# Patient Record
Sex: FEMALE | Race: WHITE | NOT HISPANIC OR LATINO | Employment: FULL TIME | URBAN - METROPOLITAN AREA
[De-identification: names, ages, dates, MRNs, and addresses within clinical notes are randomized per-mention and may not be internally consistent; named-entity substitution may affect disease eponyms.]

---

## 2017-07-26 ENCOUNTER — OFFICE VISIT (OUTPATIENT)
Dept: INTERNAL MEDICINE | Facility: CLINIC | Age: 47
End: 2017-07-26
Payer: COMMERCIAL

## 2017-07-26 VITALS
BODY MASS INDEX: 30.86 KG/M2 | WEIGHT: 192 LBS | HEIGHT: 66 IN | HEART RATE: 72 BPM | OXYGEN SATURATION: 99 % | DIASTOLIC BLOOD PRESSURE: 82 MMHG | TEMPERATURE: 98.4 F | SYSTOLIC BLOOD PRESSURE: 114 MMHG

## 2017-07-26 DIAGNOSIS — M75.51 BURSITIS OF RIGHT SHOULDER: Primary | ICD-10-CM

## 2017-07-26 PROCEDURE — 99203 OFFICE O/P NEW LOW 30 MIN: CPT | Performed by: INTERNAL MEDICINE

## 2017-07-26 RX ORDER — CALCIUM CARBONATE 500(1250)
2 TABLET ORAL 2 TIMES DAILY
COMMUNITY

## 2017-07-26 RX ORDER — IBUPROFEN 200 MG
400 TABLET ORAL
COMMUNITY
Start: 2017-07-26 | End: 2017-08-02

## 2017-07-26 RX ORDER — DIPHENOXYLATE HYDROCHLORIDE AND ATROPINE SULFATE 2.5; .025 MG/1; MG/1
TABLET ORAL
COMMUNITY

## 2017-07-26 NOTE — PROGRESS NOTES
SUBJECTIVE:                                                    Lennie  is a 47 year old female who presents to clinic today for the following health issues:    She states she hurt her shoulder after doing a particular Willy-Chi move in which she extends her arm out against another individual , elbow bent. She is supposed to use her lower body to force the individual down/over but in this case she used too much of her upper extremity and noted afterwards pain in the anterior and lateral part of her shoulder. Now she notes intermittent pain in these areas. No pain at night.  No swelling. No problems with ROM though can feel this with abduction and external rotation.    Joint Pain    Onset: few weeks    Description:   Location: right shoulder  Character: Dull ache    Intensity: mild    Progression of Symptoms: same    Accompanying Signs & Symptoms:  Other symptoms: none    History:   Previous similar pain: no       Precipitating factors:   Trauma or overuse: YES    Alleviating factors:  Improved by: rest/inactivity    Therapies Tried and outcome: rest and massage      No past medical history on file.  No past surgical history on file.  Current Outpatient Prescriptions   Medication Sig Dispense Refill     Multiple Vitamin (MULTI-VITAMINS) TABS        calcium carbonate (OS-ANGEL 500 MG [place]. [place]) 1250 MG tablet Take 2 tablets by mouth 2 times daily       ibuprofen (CVS IBUPROFEN IB) 200 MG tablet Take 2 tablets (400 mg) by mouth 3 times daily (before meals) for 7 days       Allergies as of 07/26/2017     (No Known Allergies)       Social History     Social History     Marital status: Single     Spouse name: N/A     Number of children: N/A     Years of education: N/A     Occupational History     Not on file.     Social History Main Topics     Smoking status: Never Smoker     Smokeless tobacco: Never Used     Alcohol use Yes      Comment: occasional     Drug use: No     Sexual activity: Yes     Other Topics Concern  "    Not on file     Social History Narrative     No narrative on file       No family history on file.    Problem list and histories reviewed & adjusted, as indicated.  Additional history: as documented    Labs reviewed in EPIC    Reviewed and updated as needed this visit by clinical staffTobacco  Allergies  Soc Hx      Reviewed and updated as needed this visit by Provider         ROS:  Constitutional, HEENT, cardiovascular, pulmonary, gi and gu systems are negative, except as otherwise noted.  Ortho- R 4th finger pain.   Eyes- some mild swelling under L eye    OBJECTIVE:                                                    /82  Pulse 72  Temp 98.4  F (36.9  C) (Oral)  Ht 5' 6.25\" (1.683 m)  Wt 192 lb (87.1 kg)  LMP 07/19/2017  SpO2 99%  Breastfeeding? No  BMI 30.76 kg/m2  Body mass index is 30.76 kg/(m^2).  GENERAL APPEARANCE: alert and no distress  HENT: ear canals and TM's normal and nose and mouth without ulcers or lesions  NECK: no adenopathy, no asymmetry, masses, or scars and thyroid normal to palpation  RESP: lungs clear to auscultation - no rales, rhonchi or wheezes  CV: regular rates and rhythm, normal S1 S2, no S3 or S4 and no murmur, click or rub  MS: lateral shoulder tenderness on R. Good ROM- normal and strength. No impingement symptoms     Diagnostic test results:  none        ASSESSMENT/PLAN:                                                    1. Bursitis of right shoulder  See [facility], try nsaids x 7-10 d  If no improvement after 2-3 wks consider sports med  - SPORTS MEDICINE REFERRAL  - [facility] PT, HAND, AND CHIROPRACTIC REFERRAL  - ibuprofen (CVS IBUPROFEN IB) 200 MG tablet; Take 2 tablets (400 mg) by mouth 3 times daily (before meals) for 7 days      Follow up with Provider - asher Mandel MD  [facility]    "

## 2017-07-26 NOTE — MR AVS SNAPSHOT
After Visit Summary   7/26/2017    Lennie More    MRN: [id number]           Patient Information     Date Of Birth          1970        Visit Information        Provider Department      7/26/2017 10:40 AM Austin Mandel MD [facility]        Today's Diagnoses     Bursitis of right shoulder    -  1       Follow-ups after your visit        Additional Services     ENE PT, HAND, AND CHIROPRACTIC REFERRAL       **This order will print in the [facility] Scheduling Office**    Physical Therapy, Hand Therapy and Chiropractic Care are available through:    *[facility]  *[facility]  *[facility]    Call one number to schedule at any of the above locations: [phone].    Your provider has referred you to: Physical Therapy at [facility] or [facility]    Indication/Reason for Referral: Shoulder Pain  Onset of Illness: 2 wks  Therapy Orders: Evaluate and Treat  Special Programs: None  Special Request: None    Dorys Saul      Additional Comments for the Therapist or Chiropractor:     Please be aware that coverage of these services is subject to the terms and limitations of your health insurance plan.  Call member services at your health plan with any benefit or coverage questions.      Please bring the following to your appointment:    *Your personal calendar for scheduling future appointments  *Comfortable clothing            SPORTS MEDICINE REFERRAL       Your provider has referred you to:  [facility]: [facility] - [facility]  [phone]   [email or web address]    Please be aware that coverage of these services is subject to the terms and limitations of your health insurance plan.  Call member services at your health plan with any benefit or coverage questions.      Please bring the following to your appointment:    >>   Any  "x-rays, CTs or MRIs which have been performed.  Contact the facility where they were done to arrange for  prior to your scheduled appointment.    >>   List of current medications   >>   This referral request   >>   Any documents/labs given to you for this referral                  Who to contact     If you have questions or need follow up information about today's clinic visit or your schedule please contact [facility] directly at [phone].  Normal or non-critical lab and imaging results will be communicated to you by Zero2IPOhart, letter or phone within 4 business days after the clinic has received the results. If you do not hear from us within 7 days, please contact the clinic through [employer] or phone. If you have a critical or abnormal lab result, we will notify you by phone as soon as possible.  Submit refill requests through [employer] or call your pharmacy and they will forward the refill request to us. Please allow 3 business days for your refill to be completed.          Additional Information About Your Visit        [employer] Information     [employer] lets you send messages to your doctor, view your test results, renew your prescriptions, schedule appointments and more. To sign up, go to [email or web address] . Click on \"Log in\" on the left side of the screen, which will take you to the Welcome page. Then click on \"Sign up Now\" on the right side of the page.     You will be asked to enter the access code listed below, as well as some personal information. Please follow the directions to create your username and password.     Your access code is: [email or web address]  Expires: 10/24/2017 11:13 AM     Your access code will  in 90 days. If you need help or a new code, please call your [facility] clinic or [phone].        Care EveryWhere ID     This is your Care EveryWhere ID. This could be used by other organizations to access your [facility] medical records  [id number]        Your " "Vitals Were     Pulse Temperature Height Last Period Pulse Oximetry Breastfeeding?    72 98.4  F (36.9  C) (Oral) 5' 6.25\" (1.683 m) 07/19/2017 99% No    BMI (Body Mass Index)                   30.76 kg/m2            Blood Pressure from Last 3 Encounters:   07/26/17 114/82    Weight from Last 3 Encounters:   07/26/17 192 lb (87.1 kg)              We Performed the Following     [facility] PT, HAND, AND CHIROPRACTIC REFERRAL     SPORTS MEDICINE REFERRAL          Today's Medication Changes          These changes are accurate as of: 7/26/17 11:13 AM.  If you have any questions, ask your nurse or doctor.               Start taking these medicines.        Dose/Directions    ibuprofen 200 MG tablet   Commonly known as:  CVS IBUPROFEN IB   Used for:  Bursitis of right shoulder   Started by:  Austin Mandel MD        Dose:  400 mg   Take 2 tablets (400 mg) by mouth 3 times daily (before meals) for 7 days   Refills:  0            Where to get your medicines      Some of these will need a paper prescription and others can be bought over the counter.  Ask your nurse if you have questions.     You don't need a prescription for these medications     ibuprofen 200 MG tablet                Primary Care Provider    None Specified       No primary provider on file.        Equal Access to Services     MOI ROCA AH: Fernando Rey, waayoda suzan, qaybta kaalmada brendan, patricia carmichael. So [facility] [phone].    ATENCIÓN: Si habla español, tiene a vidal disposición servicios gratuitos de asistencia lingüística. Ger al [phone].    We comply with applicable federal civil rights laws and [place] laws. We do not discriminate on the basis of race, color, national origin, age, disability sex, sexual orientation or gender identity.            Thank you!     Thank you for choosing [facility]  for your care. Our goal is always to provide you with excellent care. Hearing " back from our patients is one way we can continue to improve our services. Please take a few minutes to complete the written survey that you may receive in the mail after your visit with us. Thank you!             Your Updated Medication List - Protect others around you: Learn how to safely use, store and throw away your medicines at www.disposemymeds.org.          This list is accurate as of: 7/26/17 11:13 AM.  Always use your most recent med list.                   Brand Name Dispense Instructions for use Diagnosis    calcium carbonate 1250 MG tablet    OS-ANGEL 500 mg [place]. [place]     Take 2 tablets by mouth 2 times daily    Bursitis of right shoulder       ibuprofen 200 MG tablet    CVS IBUPROFEN IB     Take 2 tablets (400 mg) by mouth 3 times daily (before meals) for 7 days    Bursitis of right shoulder       MULTI-VITAMINS Tabs       Bursitis of right shoulder

## 2017-07-26 NOTE — NURSING NOTE
"Chief Complaint   Patient presents with     Shoulder Injury       Initial /82  Pulse 72  Temp 98.4  F (36.9  C) (Oral)  Ht 5' 6.25\" (1.683 m)  Wt 192 lb (87.1 kg)  LMP 07/19/2017  SpO2 99%  Breastfeeding? No  BMI 30.76 kg/m2 Estimated body mass index is 30.76 kg/(m^2) as calculated from the following:    Height as of this encounter: 5' 6.25\" (1.683 m).    Weight as of this encounter: 192 lb (87.1 kg).  Medication Reconciliation: complete    "

## 2017-07-31 ENCOUNTER — THERAPY VISIT (OUTPATIENT)
Dept: PHYSICAL THERAPY | Facility: CLINIC | Age: 47
End: 2017-07-31
Payer: COMMERCIAL

## 2017-07-31 DIAGNOSIS — M25.511 RIGHT SHOULDER PAIN: Primary | ICD-10-CM

## 2017-07-31 PROCEDURE — 97161 PT EVAL LOW COMPLEX 20 MIN: CPT | Mod: GP | Performed by: PHYSICAL THERAPIST

## 2017-07-31 PROCEDURE — 97530 THERAPEUTIC ACTIVITIES: CPT | Mod: GP | Performed by: PHYSICAL THERAPIST

## 2017-07-31 PROCEDURE — 97110 THERAPEUTIC EXERCISES: CPT | Mod: GP | Performed by: PHYSICAL THERAPIST

## 2017-07-31 NOTE — MR AVS SNAPSHOT
"              After Visit Summary   7/31/2017    Lennie More    MRN: [id number]           Patient Information     Date Of Birth          1970        Visit Information        Provider Department      7/31/2017 12:10 PM Aminah Wong PT [facility] Physical Therapy        Today's Diagnoses     Right shoulder pain    -  1       Follow-ups after your visit        Your next 10 appointments already scheduled     Aug 04, 2017 12:50 PM CDT   ENE Extremity with Aminah Wong PT   [facility]  )    [address] [id number] [phone]              Who to contact     If you have questions or need follow up information about today's clinic visit or your schedule please contact [facility] PHYSICAL THERAPY directly at [phone].  Normal or non-critical lab and imaging results will be communicated to you by MyChart, letter or phone within 4 business days after the clinic has received the results. If you do not hear from us within 7 days, please contact the clinic through [employer] or phone. If you have a critical or abnormal lab result, we will notify you by phone as soon as possible.  Submit refill requests through [employer] or call your pharmacy and they will forward the refill request to us. Please allow 3 business days for your refill to be completed.          Additional Information About Your Visit        MyChart Information     [employer] lets you send messages to your doctor, view your test results, renew your prescriptions, schedule appointments and more. To sign up, go to [email or web address] . Click on \"Log in\" on the left side of the screen, which will take you to the Welcome page. Then click on \"Sign up Now\" on the right side of the page.     You will be asked to enter the access code listed below, as well as some personal information. " Please follow the directions to create your username and password.     Your access code is: [email or web address]  Expires: 10/24/2017 11:13 AM     Your access code will  in 90 days. If you need help or a new code, please call your [facility] clinic or [phone].        Care EveryWhere ID     This is your Care EveryWhere ID. This could be used by other organizations to access your [facility] medical records  [id number]        Your Vitals Were     Last Period                   2017            Blood Pressure from Last 3 Encounters:   17 114/82    Weight from Last 3 Encounters:   17 87.1 kg (192 lb)              We Performed the Following     HC PT EVAL, LOW COMPLEXITY     ENE INITIAL EVAL REPORT     THERAPEUTIC ACTIVITIES     THERAPEUTIC EXERCISES        Primary Care Provider    None Specified       No primary provider on file.        Equal Access to Services     [facility]: Hadii nimco natarajan Sobrenna, waaxda luqadaha, qaybta kaalmada brendan, patricia otto . So [facility] [phone].    ATENCIÓN: Si habla español, tiene a vidal disposición servicios gratuitos de asistencia lingüística. Llame al [phone].    We comply with applicable federal civil rights laws and [place] laws. We do not discriminate on the basis of race, color, national origin, age, disability sex, sexual orientation or gender identity.            Thank you!     Thank you for choosing [facility]  for your care. Our goal is always to provide you with excellent care. Hearing back from our patients is one way we can continue to improve our services. Please take a few minutes to complete the written survey that you may receive in the mail after your visit with us. Thank you!             Your Updated Medication List - Protect others around you: Learn how to safely use, store and throw away your medicines at www.disposemymeds.org.          This list is  accurate as of: 7/31/17  3:52 PM.  Always use your most recent med list.                   Brand Name Dispense Instructions for use Diagnosis    calcium carbonate 1250 MG tablet    OS-ANGEL 500 mg [place]. [place]     Take 2 tablets by mouth 2 times daily    Bursitis of right shoulder       ibuprofen 200 MG tablet    CVS IBUPROFEN IB     Take 2 tablets (400 mg) by mouth 3 times daily (before meals) for 7 days    Bursitis of right shoulder       MULTI-VITAMINS Tabs       Bursitis of right shoulder

## 2017-07-31 NOTE — PROGRESS NOTES
"[facility] Initial Evaluation      Subjective:    Patient is a 47 year old female presenting with rehab right shoulder hpi.   Lennie  is a 47 year old female with a right shoulder condition.        Patient injured her right shoulder in a ferny chi class about 3-4 weeks ago.  She was standing, elbow flexed and pushing against another person in front of her - \"felt something in her shoulder\".  Right shoulder pain is superior and lateral upper arm, ranges 0-6/10, describes as \"dull\".  Symptoms increase with reaching anterior/lateral and lateral, slight with reaching behind, slight with lifting groceries.  She is able to lie on the right without discomfort.  Symptoms decrease with stopping offending activity, with acetominophen (has been taking 2 tablets 4-5x/day).  Patient is right-handed.  Patient was doing 12 hours/week ferny chi and martial arts, nothing for the last week as well as cardio at the club (elliptical and treadmill).  .             Pain is worse in the P.M..     Since onset symptoms are unchanged.    Previous treatment: massage - short-term relief.    General health as reported by patient is good.  Pertinent medical history includes:  Overweight.  Medical allergies: no.  Other surgeries include:  Other.  Current medications:  Anti-inflammatory (OTC ibuprofen).  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  Patient is working in normal job without restrictions.  Primary job tasks include:  Prolonged sitting.    Barriers include:  None as reported by the patient.    Red flags:  None as reported by the patient.                        Objective:    Standing Alignment:      Shoulder/UE:  Rounded shoulders                                       Shoulder Evaluation:  ROM:  AROM:    Flexion:  Left:  WNL    Right:  Minimal limitation, pain past 90 degrees   Extension: Left: WNLRight: minimal limitation  Abduction:  Left: WNL   Right:  WNL - pain past 90 " degrees    Internal Rotation:  Left:  WNL    Right:  WNL  External Rotation:  Left:  WNL    Right:  WNL            Extension/Internal Rotation:  Left:  T6    Right:  T8              Special Tests:        Right shoulder negative for the following special tests:Impingement and Bursal  Palpation:  Palpation assessed shoulder: minimal.    Right shoulder tenderness present at: Supraspinatus                                     General     ROS  MMT left shoulder WNL.   MMT right shoulder 5/5 flexion and IR, 4+/5 abduction and ER with slight pain after testing abduction and ER.      Repeated right shoulder extension with wand overpressure in standing:  Abduction AROM increase, decrease pain with abduction past 90 degrees, pain with shoulder flexion minimal at end ROM only     Cervical AROM: WNL all directions, no discomfort     Assessment/Plan:      Patient is a 47 year old female with right shoulder complaints, indicative of shoulder derangement.  Patient will start with repeated extension with overpressure and will reassess next visit.   Patient has the following significant findings with corresponding treatment plan.                Diagnosis 1:  Right shoulder bursitis    Pain -  self management, education, directional preference exercise and home program  Decreased ROM/flexibility - therapeutic exercise and home program  Decreased strength - therapeutic exercise, therapeutic activities and home program  Decreased function - therapeutic activities and home program    Therapy Evaluation Codes:   1) History comprised of:   Personal factors that impact the plan of care:      None.    Comorbidity factors that impact the plan of care are:      None.     Medications impacting care: None.  2) Examination of Body Systems comprised of:   Body structures and functions that impact the plan of care:      Shoulder.   Activity limitations that impact the plan of care are:      Dressing, Lifting and Sports.  3) Clinical presentation  characteristics are:   Stable/Uncomplicated.  4) Decision-Making    Low complexity using standardized patient assessment instrument and/or measureable assessment of functional outcome.  Cumulative Therapy Evaluation is: Low complexity.    Previous and current functional limitations:  (See Goal Flow Sheet for this information)    Short term and Long term goals: (See Goal Flow Sheet for this information)     Communication ability:  Patient appears to be able to clearly communicate and understand verbal and written communication and follow directions correctly.  Treatment Explanation - The following has been discussed with the patient:   RX ordered/plan of care  Anticipated outcomes  Possible risks and side effects  This patient would benefit from PT intervention to resume normal activities.   Rehab potential is excellent.    Frequency:  1 X week, once daily  Duration:  for 3-6 weeks  Discharge Plan:  Achieve all LTG.  Independent in home treatment program.  Reach maximal therapeutic benefit.    Please refer to the daily flowsheet for treatment today, total treatment time and time spent performing 1:1 timed codes.

## 2017-08-04 ENCOUNTER — THERAPY VISIT (OUTPATIENT)
Dept: PHYSICAL THERAPY | Facility: CLINIC | Age: 47
End: 2017-08-04
Payer: COMMERCIAL

## 2017-08-04 DIAGNOSIS — M25.511 RIGHT SHOULDER PAIN: ICD-10-CM

## 2017-08-04 PROCEDURE — 97110 THERAPEUTIC EXERCISES: CPT | Mod: GP | Performed by: PHYSICAL THERAPIST

## 2017-08-04 PROCEDURE — 97112 NEUROMUSCULAR REEDUCATION: CPT | Mod: GP | Performed by: PHYSICAL THERAPIST

## 2017-09-13 ENCOUNTER — THERAPY VISIT (OUTPATIENT)
Dept: PHYSICAL THERAPY | Facility: CLINIC | Age: 47
End: 2017-09-13
Payer: COMMERCIAL

## 2017-09-13 DIAGNOSIS — M25.511 RIGHT SHOULDER PAIN: ICD-10-CM

## 2017-09-13 PROCEDURE — 97112 NEUROMUSCULAR REEDUCATION: CPT | Mod: GP | Performed by: PHYSICAL THERAPIST

## 2017-09-13 PROCEDURE — 97110 THERAPEUTIC EXERCISES: CPT | Mod: GP | Performed by: PHYSICAL THERAPIST

## 2017-09-27 ENCOUNTER — THERAPY VISIT (OUTPATIENT)
Dept: PHYSICAL THERAPY | Facility: CLINIC | Age: 47
End: 2017-09-27
Payer: COMMERCIAL

## 2017-09-27 DIAGNOSIS — M25.511 RIGHT SHOULDER PAIN: ICD-10-CM

## 2017-09-27 PROCEDURE — 97110 THERAPEUTIC EXERCISES: CPT | Mod: GP | Performed by: PHYSICAL THERAPIST

## 2017-09-27 PROCEDURE — 97530 THERAPEUTIC ACTIVITIES: CPT | Mod: GP | Performed by: PHYSICAL THERAPIST

## 2017-09-27 NOTE — MR AVS SNAPSHOT
"              After Visit Summary   2017    Lennie More    MRN: [id number]           Patient Information     Date Of Birth          1970        Visit Information        Provider Department      2017 7:40 AM Aminah Wong PT [facility] Physical Therapy        Today's Diagnoses     Right shoulder pain           Follow-ups after your visit        Who to contact     If you have questions or need follow up information about today's clinic visit or your schedule please contact [facility] PHYSICAL THERAPY directly at [phone].  Normal or non-critical lab and imaging results will be communicated to you by GOintegrohart, letter or phone within 4 business days after the clinic has received the results. If you do not hear from us within 7 days, please contact the clinic through Labtript or phone. If you have a critical or abnormal lab result, we will notify you by phone as soon as possible.  Submit refill requests through The [employer] or call your pharmacy and they will forward the refill request to us. Please allow 3 business days for your refill to be completed.          Additional Information About Your Visit        MyChart Information     The Wireless Registry lets you send messages to your doctor, view your test results, renew your prescriptions, schedule appointments and more. To sign up, go to [email or web address] . Click on \"Log in\" on the left side of the screen, which will take you to the Welcome page. Then click on \"Sign up Now\" on the right side of the page.     You will be asked to enter the access code listed below, as well as some personal information. Please follow the directions to create your username and password.     Your access code is: [email or web address]  Expires: 10/24/2017 11:13 AM     Your access code will  in 90 days. If you need help or a new code, please call your [place] clinic or [phone].        Care EveryWhere ID     " This is your Care EveryWhere ID. This could be used by other organizations to access your [place] medical records  [id number]         Blood Pressure from Last 3 Encounters:   07/26/17 114/82    Weight from Last 3 Encounters:   07/26/17 87.1 kg (192 lb)              We Performed the Following     THERAPEUTIC ACTIVITIES     THERAPEUTIC EXERCISES        Primary Care Provider    None Specified       No primary provider on file.        Equal Access to Services     [facility]: Hadii aad ku hadasho Soomaali, waaxda luqadaha, qaybta kaalmada adeegyada, waxay paulinoin haydandyn adedonita valenciasherrie toto . So [facility] [phone].    ATENCIÓN: Si habla español, tiene a vidal disposición servicios gratuitos de asistencia lingüística. Llame al [phone].    We comply with applicable federal civil rights laws and [place] laws. We do not discriminate on the basis of race, color, national origin, age, disability sex, sexual orientation or gender identity.            Thank you!     Thank you for choosing [place] FOR ATHLETIC MEDICINE [facility] PHYSICAL THERAPY  for your care. Our goal is always to provide you with excellent care. Hearing back from our patients is one way we can continue to improve our services. Please take a few minutes to complete the written survey that you may receive in the mail after your visit with us. Thank you!             Your Updated Medication List - Protect others around you: Learn how to safely use, store and throw away your medicines at www.disposemymeds.org.          This list is accurate as of: 9/27/17  4:54 PM.  Always use your most recent med list.                   Brand Name Dispense Instructions for use Diagnosis    calcium carbonate 1250 MG tablet    OS-ANGEL 500 mg [place]. Ca     Take 2 tablets by mouth 2 times daily    Bursitis of right shoulder       MULTI-VITAMINS Tabs       Bursitis of right shoulder

## 2018-03-26 ENCOUNTER — OFFICE VISIT (OUTPATIENT)
Dept: INTERNAL MEDICINE | Facility: CLINIC | Age: 48
End: 2018-03-26
Payer: COMMERCIAL

## 2018-03-26 VITALS
RESPIRATION RATE: 16 BRPM | HEIGHT: 66 IN | WEIGHT: 193.3 LBS | OXYGEN SATURATION: 97 % | TEMPERATURE: 98.1 F | BODY MASS INDEX: 31.07 KG/M2 | DIASTOLIC BLOOD PRESSURE: 90 MMHG | HEART RATE: 77 BPM | SYSTOLIC BLOOD PRESSURE: 142 MMHG

## 2018-03-26 DIAGNOSIS — J06.9 UPPER RESPIRATORY TRACT INFECTION, UNSPECIFIED TYPE: Primary | ICD-10-CM

## 2018-03-26 PROCEDURE — 99213 OFFICE O/P EST LOW 20 MIN: CPT | Performed by: PHYSICIAN ASSISTANT

## 2018-03-26 ASSESSMENT — PAIN SCALES - GENERAL: PAINLEVEL: NO PAIN (0)

## 2018-03-26 NOTE — MR AVS SNAPSHOT
"              After Visit Summary   3/26/2018    Lennie More    MRN: [id number]           Patient Information     Date Of Birth          1970        Visit Information        Provider Department      3/26/2018 10:00 AM Jillian Eaton PA-C [facility]        Today's Diagnoses     Upper respiratory tract infection, unspecified type    -  1      Care Instructions    Robitussin DM  Delsym as need for coughing.    Saline Rinse to help with congestion.               Follow-ups after your visit        Who to contact     If you have questions or need follow up information about today's clinic visit or your schedule please contact [facility] directly at [phone].  Normal or non-critical lab and imaging results will be communicated to you by Nomacorchart, letter or phone within 4 business days after the clinic has received the results. If you do not hear from us within 7 days, please contact the clinic through Nomacorchart or phone. If you have a critical or abnormal lab result, we will notify you by phone as soon as possible.  Submit refill requests through Genecure or call your pharmacy and they will forward the refill request to us. Please allow 3 business days for your refill to be completed.          Additional Information About Your Visit        MyChart Information     Genecure lets you send messages to your doctor, view your test results, renew your prescriptions, schedule appointments and more. To sign up, go to [email or web address] . Click on \"Log in\" on the left side of the screen, which will take you to the Welcome page. Then click on \"Sign up Now\" on the right side of the page.     You will be asked to enter the access code listed below, as well as some personal information. Please follow the directions to create your username and password.     Your access code is: [email or web address]  Expires: 2018 10:25 AM     Your access code will  in 90 days. If " "you need help or a new code, please call your [place] clinic or [phone].        Care EveryWhere ID     This is your Care EveryWhere ID. This could be used by other organizations to access your [place] medical records  [id number]        Your Vitals Were     Pulse Temperature Respirations Height Pulse Oximetry BMI (Body Mass Index)    77 98.1  F (36.7  C) (Oral) 16 5' 6\" (1.676 m) 97% 31.2 kg/m2       Blood Pressure from Last 3 Encounters:   03/26/18 142/90   07/26/17 114/82    Weight from Last 3 Encounters:   03/26/18 193 lb 4.8 oz (87.7 kg)   07/26/17 192 lb (87.1 kg)              Today, you had the following     No orders found for display       Primary Care Provider    None Specified       No primary provider on file.        Equal Access to Services     [facility]DA [facility] : Hadii nimco Rey, wanancy mares, ruba kaalismael cardona, patricia otto . So [facility] [phone].    ATENCIÓN: Si habla español, tiene a vidal disposición servicios gratuitos de asistencia lingüística. Ger al [phone].    We comply with applicable federal civil rights laws and [place] laws. We do not discriminate on the basis of race, color, national origin, age, disability, sex, sexual orientation, or gender identity.            Thank you!     Thank you for choosing [facility]  for your care. Our goal is always to provide you with excellent care. Hearing back from our patients is one way we can continue to improve our services. Please take a few minutes to complete the written survey that you may receive in the mail after your visit with us. Thank you!             Your Updated Medication List - Protect others around you: Learn how to safely use, store and throw away your medicines at www.disposemymeds.org.          This list is accurate as of 3/26/18 10:25 AM.  Always use your most recent med list.                   Brand Name Dispense Instructions for use Diagnosis    " calcium carbonate 1250 MG tablet    OS-ANGEL 500 mg [place]. Ca     Take 2 tablets by mouth 2 times daily    Bursitis of right shoulder       MULTI-VITAMINS Tabs       Bursitis of right shoulder

## 2018-03-26 NOTE — PROGRESS NOTES
"  SUBJECTIVE:   Lennie  is a 47 year old female who presents to clinic today for the following health issues:      Acute Illness   Acute illness concerns: Cough/Nasal Congestion   Onset: x1 wk     Fever: no     Felt warm     Chills/Sweats: no     Headache (location?): YES    Sinus Pressure:YES    Conjunctivitis:  no    Ear Pain: no    Rhinorrhea: no     Congestion: YES    Sore Throat: YES- slightly -    Slight post nasal      Cough: YES- at time productive     Wheeze: no     Decreased Appetite: YES- slightly     Nausea: no     Vomiting: no     Diarrhea:  no     Dysuria/Freq.: no     Fatigue/Achiness: YES- fatigue     Sick/Strep Exposure: no      Therapies Tried and outcome: nyquil-with some relief of sleep and tylenol-helps with sore throat           -------------------------------------    Problem list and histories reviewed & adjusted, as indicated.  Additional history: as documented    Labs reviewed in EPIC    Reviewed and updated as needed this visit by clinical staff  Tobacco  Allergies  Meds       Reviewed and updated as needed this visit by Provider  Allergies  Meds         ROS:  Constitutional, HEENT, cardiovascular, pulmonary, gi and gu systems are negative, except as otherwise noted.    OBJECTIVE:     /90 (BP Location: Left arm, Patient Position: Chair, Cuff Size: Adult Regular)  Pulse 77  Temp 98.1  F (36.7  C) (Oral)  Resp 16  Ht 5' 6\" (1.676 m)  Wt 193 lb 4.8 oz (87.7 kg)  SpO2 97%  BMI 31.2 kg/m2  Body mass index is 31.2 kg/(m^2).  GENERAL: healthy, alert and no distress  HENT: normal cephalic/atraumatic, ear canals and TM's normal, nose and mouth without ulcers or lesions, nasal mucosa edematous , rhinorrhea clear, oropharynx clear and oral mucous membranes moist  NECK: no adenopathy, no asymmetry, masses, or scars and thyroid normal to palpation  RESP: lungs clear to auscultation - no rales, rhonchi or wheezes  CV: regular rate and rhythm, normal S1 S2, no S3 or S4, no " murmur, click or rub, no peripheral edema and peripheral pulses strong  MS: no gross musculoskeletal defects noted, no edema  SKIN: no suspicious lesions or rashes    Diagnostic Test Results:  none     ASSESSMENT/PLAN:             1. Upper respiratory tract infection, unspecified type        Patient Instructions   Robitussin DM  Delsym as need for coughing.    Saline Rinse to help with congestion.         Fluids and monitor  If worsening cough, fever then recheck in clinic     Jillian Eaton PA-C  [facility]

## 2018-05-11 ENCOUNTER — OFFICE VISIT (OUTPATIENT)
Dept: INTERNAL MEDICINE | Facility: CLINIC | Age: 48
End: 2018-05-11
Payer: COMMERCIAL

## 2018-05-11 VITALS
HEART RATE: 81 BPM | WEIGHT: 189.8 LBS | DIASTOLIC BLOOD PRESSURE: 80 MMHG | TEMPERATURE: 98.7 F | RESPIRATION RATE: 8 BRPM | SYSTOLIC BLOOD PRESSURE: 142 MMHG | OXYGEN SATURATION: 99 % | BODY MASS INDEX: 30.63 KG/M2

## 2018-05-11 DIAGNOSIS — N89.8 VAGINAL IRRITATION: Primary | ICD-10-CM

## 2018-05-11 LAB
SPECIMEN SOURCE: NORMAL
WET PREP SPEC: NORMAL

## 2018-05-11 PROCEDURE — 87210 SMEAR WET MOUNT SALINE/INK: CPT | Performed by: PHYSICIAN ASSISTANT

## 2018-05-11 PROCEDURE — 99213 OFFICE O/P EST LOW 20 MIN: CPT | Performed by: PHYSICIAN ASSISTANT

## 2018-05-11 RX ORDER — CLOTRIMAZOLE 1 %
1 CREAM WITH APPLICATOR VAGINAL DAILY
Status: CANCELLED | OUTPATIENT
Start: 2018-05-11

## 2018-05-11 ASSESSMENT — PAIN SCALES - GENERAL: PAINLEVEL: NO PAIN (1)

## 2018-05-11 NOTE — PROGRESS NOTES
SUBJECTIVE:   Lennie  is a 48 year old female who presents to clinic today for the following health issues:    Chief Complaint   Patient presents with     Vaginal Problem     Pt states that she feels as is she has scratched her labia- and then she feels as if she has a twing that goes up into the abdominal area. there is a discomfort.   x 3 days.      Vaginal Symptoms  Onset: 3 days     Description:  Vaginal Discharge: none   Itching (Pruritis): no   Burning sensation:  no not really - comes and goes   Odor: no     Accompanying Signs & Symptoms:  Pain with Urination: no   Abdominal Pain: YES- LLQ comes and goes as well   Fever: no     History:   Sexually active: no   New Partner: no   Possibility of Pregnancy:  No    Precipitating factors:   Recent Antibiotic Use: no     Alleviating factors:  None     LMP: 14 days ago, colette-menopausal       Problem list and histories reviewed & adjusted, as indicated.  Additional history: as documented      Reviewed and updated as needed this visit by clinical staff  Allergies  Meds  Problems       Reviewed and updated as needed this visit by Provider  Meds  Problems           OBJECTIVE:     /80  Pulse 81  Temp 98.7  F (37.1  C) (Oral)  Resp 8  Wt 189 lb 12.8 oz (86.1 kg)  SpO2 99%  Breastfeeding? No  BMI 30.63 kg/m2  Body mass index is 30.63 kg/(m^2).  GENERAL: healthy, alert and no distress  CV: regular rates and rhythm, normal S1 S2, no S3 or S4 and no murmur, click or rub  ABDOMEN: soft, nontender, no hepatosplenomegaly, no masses and bowel sounds normal   (female): normal female external genitalia- skin breakdown on external labia with mild erythema, normal urethral meatus  and vaginal mucosa pink, moist, well rugated, and no DC or odor noted     Diagnostic Test Results:  Results for orders placed or performed in visit on 05/11/18   Wet prep   Result Value Ref Range    Specimen Description Vagina     Wet Prep No Trichomonas seen     Wet Prep No  clue cells seen     Wet Prep No yeast seen        ASSESSMENT/PLAN:       1. Vaginal irritation  - Wet prep  - based on skin breakdown appearance candidal infection suspected  - pt to try topical monistat bid x 7-14 days   - follow up if no improvement or worsening symptoms   - pt agreed to above plan and all questions are answered     Mariama Royal PA-C  [facility]

## 2018-05-11 NOTE — MR AVS SNAPSHOT
"              After Visit Summary   2018    Lennie More    MRN: [id number]           Patient Information     Date Of Birth          1970        Visit Information        Provider Department      2018 1:00 PM Mariama Royal PA-C [facility] [place]        Today's Diagnoses     Vaginal irritation    -  1      Care Instructions    Try topical monistat twice a day for 7-14 days     - if no relief, contact mw           Follow-ups after your visit        Who to contact     If you have questions or need follow up information about today's clinic visit or your schedule please contact [facility] directly at [phone].  Normal or non-critical lab and imaging results will be communicated to you by MyChart, letter or phone within 4 business days after the clinic has received the results. If you do not hear from us within 7 days, please contact the clinic through MyChart or phone. If you have a critical or abnormal lab result, we will notify you by phone as soon as possible.  Submit refill requests through [employer] or call your pharmacy and they will forward the refill request to us. Please allow 3 business days for your refill to be completed.          Additional Information About Your Visit        MyChart Information     Keen Home lets you send messages to your doctor, view your test results, renew your prescriptions, schedule appointments and more. To sign up, go to [email or web address] . Click on \"Log in\" on the left side of the screen, which will take you to the Welcome page. Then click on \"Sign up Now\" on the right side of the page.     You will be asked to enter the access code listed below, as well as some personal information. Please follow the directions to create your username and password.     Your access code is: [email or web address]  Expires: 2018 10:25 AM     Your access code will  in 90 days. If you need help or a new code, please call your " [facility] or [phone].        Care EveryWhere ID     This is your Care EveryWhere ID. This could be used by other organizations to access your [place] medical records  [id number]        Your Vitals Were     Pulse Temperature Respirations Pulse Oximetry Breastfeeding? BMI (Body Mass Index)    81 98.7  F (37.1  C) (Oral) 8 99% No 30.63 kg/m2       Blood Pressure from Last 3 Encounters:   05/11/18 142/80   03/26/18 142/90   07/26/17 114/82    Weight from Last 3 Encounters:   05/11/18 189 lb 12.8 oz (86.1 kg)   03/26/18 193 lb 4.8 oz (87.7 kg)   07/26/17 192 lb (87.1 kg)              We Performed the Following     Wet prep        Primary Care Provider Fax #    Physician No Ref-Primary [phone]       No address on file        Equal Access to Services     [facility] : Hadii nimco Rey, waaxda luqadaha, qaybta kaalmada brendan, patricia otto . So [facility] [phone].    ATENCIÓN: Si habla español, tiene a vidal disposición servicios gratuitos de asistencia lingüística. Llame al [phone].    We comply with applicable federal civil rights laws and [place] laws. We do not discriminate on the basis of race, color, national origin, age, disability, sex, sexual orientation, or gender identity.            Thank you!     Thank you for choosing [facility]  for your care. Our goal is always to provide you with excellent care. Hearing back from our patients is one way we can continue to improve our services. Please take a few minutes to complete the written survey that you may receive in the mail after your visit with us. Thank you!             Your Updated Medication List - Protect others around you: Learn how to safely use, store and throw away your medicines at www.disposemymeds.org.          This list is accurate as of 5/11/18  2:41 PM.  Always use your most recent med list.                   Brand Name Dispense Instructions for use Diagnosis     calcium carbonate 500 MG tablet    OS-ANGEL 500 mg [place]. Ca     Take 2 tablets by mouth 2 times daily    Bursitis of right shoulder       MULTI-VITAMINS Tabs       Bursitis of right shoulder

## 2018-11-20 PROBLEM — M25.511 RIGHT SHOULDER PAIN: Status: RESOLVED | Noted: 2017-07-31 | Resolved: 2018-11-20

## 2018-11-20 NOTE — PROGRESS NOTES
"Subjective:  HPI                    Objective:  System    Physical Exam    General     ROS    Assessment/Plan:    DISCHARGE REPORT    Progress reporting period is from 7-31-17 to 9-27-17, 4 visits.       SUBJECTIVE  Patient initially had c/o pain right superior shoulder and lateral upper arm, ranged 0-6/10, described as \"dull\".  Symptoms increased with reaching anterior/lateral and lateral, slight with reaching behind, slight with lifting groceries.  She was able to lie on the right without discomfort. She also had secondary c/o bilateral lower arm and hand tingling.  At last visit she reported shoulder continued to improve slowly.  Symptoms were ranging 0-3/10, present primarily with reaching behind, reaching quickly across her body and at the end of her ferny chi workout with her sword.  Had been consistent with shoulder strength program every other day for her shoulder, had started light weight (<1#) for scaption and ER, has not yet added weight to extension.  Had also been doing cervical retraction/extension as prescribed, > every 2 hours, with short-term relief of bilateral hand (digits 4 and 5) and lower arm tingling bilaterally.  She was able to get temporary relief of symptoms, but would return as soon as 15' later.    Patient did not return for further visits so discharge status unknown.  Pain level at last visit: 0/10.     Initial Pain level:  (0-6/10).   Changes in function:  Yes (See Goal flowsheet attached for changes in current functional level)  Adverse reaction to treatment or activity: None    OBJECTIVE  At last visit cervical AROM rotation WNL bilaterally, extension 90%.    Advanced shoulder strength program, modified cervical program.      ASSESSMENT/PLAN  Updated problem list and treatment plan: Diagnosis 1:  Right shoulder bursitis   Assessment of Progress: The patient has not returned to therapy. Current status is unknown.  Self Management Plans:  Patient has been instructed in a home treatment " program.      Recommendations:  Discharge PT chart as did not return for further visits.    Please refer to the daily flowsheet for treatment today, total treatment time and time spent performing 1:1 timed codes.

## 2020-01-29 ENCOUNTER — OFFICE VISIT (OUTPATIENT)
Dept: INTERNAL MEDICINE | Facility: CLINIC | Age: 50
End: 2020-01-29
Payer: COMMERCIAL

## 2020-01-29 VITALS
RESPIRATION RATE: 16 BRPM | OXYGEN SATURATION: 98 % | HEART RATE: 73 BPM | HEIGHT: 66 IN | SYSTOLIC BLOOD PRESSURE: 126 MMHG | WEIGHT: 201.5 LBS | BODY MASS INDEX: 32.38 KG/M2 | DIASTOLIC BLOOD PRESSURE: 76 MMHG | TEMPERATURE: 98.7 F

## 2020-01-29 DIAGNOSIS — Z23 NEED FOR VACCINATION: ICD-10-CM

## 2020-01-29 DIAGNOSIS — Z11.4 ENCOUNTER FOR SCREENING FOR HIV: ICD-10-CM

## 2020-01-29 DIAGNOSIS — Z12.31 ENCOUNTER FOR SCREENING MAMMOGRAM FOR BREAST CANCER: ICD-10-CM

## 2020-01-29 DIAGNOSIS — B00.1 COLD SORE: ICD-10-CM

## 2020-01-29 DIAGNOSIS — Z00.00 ROUTINE HISTORY AND PHYSICAL EXAMINATION OF ADULT: Primary | ICD-10-CM

## 2020-01-29 PROCEDURE — 90714 TD VACC NO PRESV 7 YRS+ IM: CPT | Performed by: PHYSICIAN ASSISTANT

## 2020-01-29 PROCEDURE — 90471 IMMUNIZATION ADMIN: CPT | Performed by: PHYSICIAN ASSISTANT

## 2020-01-29 PROCEDURE — 99396 PREV VISIT EST AGE 40-64: CPT | Mod: 25 | Performed by: PHYSICIAN ASSISTANT

## 2020-01-29 RX ORDER — VALACYCLOVIR HYDROCHLORIDE 1 G/1
2000 TABLET, FILM COATED ORAL 2 TIMES DAILY
Qty: 4 TABLET | Refills: 5 | Status: SHIPPED | OUTPATIENT
Start: 2020-01-29 | End: 2023-11-02

## 2020-01-29 RX ORDER — INFLUENZA A VIRUS A/NEBRASKA/14/2019 (H1N1) ANTIGEN (MDCK CELL DERIVED, PROPIOLACTONE INACTIVATED), INFLUENZA A VIRUS A/DELAWARE/39/2019 (H3N2) ANTIGEN (MDCK CELL DERIVED, PROPIOLACTONE INACTIVATED), INFLUENZA B VIRUS B/SINGAPORE/INFTT-16-0610/2016 ANTIGEN (MDCK CELL DERIVED, PROPIOLACTONE INACTIVATED), INFLUENZA B VIRUS B/DARWIN/7/2019 ANTIGEN (MDCK CELL DERIVED, PROPIOLACTONE INACTIVATED) 15; 15; 15; 15 UG/.5ML; UG/.5ML; UG/.5ML; UG/.5ML
INJECTION, SUSPENSION INTRAMUSCULAR
COMMUNITY
Start: 2019-11-09 | End: 2020-01-29

## 2020-01-29 SDOH — HEALTH STABILITY: MENTAL HEALTH: HOW OFTEN DO YOU HAVE 6 OR MORE DRINKS ON ONE OCCASION?: NEVER

## 2020-01-29 SDOH — HEALTH STABILITY: MENTAL HEALTH: HOW OFTEN DO YOU HAVE A DRINK CONTAINING ALCOHOL?: 4 OR MORE TIMES A WEEK

## 2020-01-29 SDOH — HEALTH STABILITY: MENTAL HEALTH: HOW MANY STANDARD DRINKS CONTAINING ALCOHOL DO YOU HAVE ON A TYPICAL DAY?: 1 OR 2

## 2020-01-29 ASSESSMENT — MIFFLIN-ST. JEOR: SCORE: 1550.75

## 2020-01-29 NOTE — PROGRESS NOTES
Prior to immunization administration, verified patients identity using patient s name and date of birth. Please see Immunization Activity for additional information.     Screening Questionnaire for Adult Immunization    Are you sick today?   No   Do you have allergies to medications, food, a vaccine component or latex?   No   Have you ever had a serious reaction after receiving a vaccination?   No   Do you have a long-term health problem with heart, lung, kidney, or metabolic disease (e.g., diabetes), asthma, a blood disorder, no spleen, complement component deficiency, a cochlear implant, or a spinal fluid leak?  Are you on long-term aspirin therapy?   No   Do you have cancer, leukemia, HIV/AIDS, or any other immune system problem?   No   Do you have a parent, brother, or sister with an immune system problem?   No   In the past 3 months, have you taken medications that affect  your immune system, such as prednisone, other steroids, or anticancer drugs; drugs for the treatment of rheumatoid arthritis, Crohn s disease, or psoriasis; or have you had radiation treatments?   No   Have you had a seizure, or a brain or other nervous system problem?   No   During the past year, have you received a transfusion of blood or blood    products, or been given immune (gamma) globulin or antiviral drug?   No   For women: Are you pregnant or is there a chance you could become       pregnant during the next month?   No   Have you received any vaccinations in the past 4 weeks?   No     Immunization questionnaire answers were all negative.        Per orders of Mariama Adams PA-C, injection of Td given by Brittany Sanches LPN. Patient instructed to remain in clinic for 15 minutes afterwards, and to report any adverse reaction to me immediately.       Screening performed by Brittany Sanches LPN on 1/29/2020 at 2:41 PM.

## 2020-01-29 NOTE — PROGRESS NOTES
SUBJECTIVE:   CC: Lennie  is an 49 year old woman who presents for preventive health visit.     Healthy Habits:     Getting at least 3 servings of Calcium per day:  Yes    Bi-annual eye exam:  Yes    Dental care twice a year:  Yes    Sleep apnea or symptoms of sleep apnea:  None    Diet:  Regular (no restrictions)    Frequency of exercise:  6-7 days/week    Duration of exercise:  30-45 minutes    Taking medications regularly:  Yes    Medication side effects:  Not applicable    PHQ-2 Total Score: 0    Additional concerns today:  Yes    Pt notes history of heat blisters that she is needing to use abrevia for. She is wondering if something else can be given      Today's PHQ-2 Score:   PHQ-2 ( 1999 Pfizer) 1/29/2020   Q1: Little interest or pleasure in doing things 0   Q2: Feeling down, depressed or hopeless 0   PHQ-2 Score 0   Q1: Little interest or pleasure in doing things Not at all   Q2: Feeling down, depressed or hopeless Not at all   PHQ-2 Score 0       Abuse: Current or Past(Physical, Sexual or Emotional)- No  Do you feel safe in your environment? Yes      Social History     Tobacco Use     Smoking status: Never Smoker     Smokeless tobacco: Never Used   Substance Use Topics     Alcohol use: Yes     Alcohol/week: 1.0 standard drinks     Types: 1 Cans of beer per week     Frequency: 4 or more times a week     Drinks per session: 1 or 2     Binge frequency: Never     Comment: one a week      If you drink alcohol do you typically have >3 drinks per day or >7 drinks per week? No    Alcohol Use 1/29/2020   Prescreen: >3 drinks/day or >7 drinks/week? No   Prescreen: >3 drinks/day or >7 drinks/week? -       Reviewed orders with patient.  Reviewed health maintenance and updated orders accordingly - Yes    Mammogram Screening: Patient over age 50, mutual decision to screen reflected in health maintenance.    Pertinent mammograms are reviewed under the imaging tab.  History of abnormal Pap smear: NO - age 30-65  "PAP every 5 years with negative HPV co-testing recommended     Reviewed and updated as needed this visit by clinical staff  Tobacco  Allergies  Meds  Problems  Fam Hx  Soc Hx        Reviewed and updated as needed this visit by Provider  Tobacco  Meds  Problems  Fam Hx  Soc Hx           Review of Systems  CONSTITUTIONAL: NEGATIVE for fever, chills, change in weight  INTEGUMENTARU/SKIN: NEGATIVE for worrisome rashes, moles or lesions  EYES: NEGATIVE for vision changes or irritation  ENT: NEGATIVE for ear, mouth and throat problems  RESP: NEGATIVE for significant cough or SOB  BREAST: NEGATIVE for masses, tenderness or discharge  CV: NEGATIVE for chest pain, palpitations or peripheral edema  GI: NEGATIVE for nausea, abdominal pain, heartburn, or change in bowel habits  : NEGATIVE for unusual urinary or vaginal symptoms. Periods are irregular and getting some hot flashes.   MUSCULOSKELETAL: NEGATIVE for significant arthralgias or myalgia  NEURO: NEGATIVE for weakness, dizziness or paresthesias  PSYCHIATRIC: NEGATIVE for changes in mood or affect     OBJECTIVE:   /76   Pulse 73   Temp 98.7  F (37.1  C) (Oral)   Resp 16   Ht 1.676 m (5' 6\")   Wt 91.4 kg (201 lb 8 oz)   LMP 01/24/2020 (Exact Date)   SpO2 98%   Breastfeeding No   BMI 32.52 kg/m    Physical Exam  GENERAL: healthy, alert and no distress  EYES: Eyes grossly normal to inspection, PERRL and conjunctivae and sclerae normal  HENT: ear canals and TM's normal, nose and mouth without ulcers or lesions  NECK: no adenopathy, no asymmetry, masses, or scars and thyroid normal to palpation  RESP: lungs clear to auscultation - no rales, rhonchi or wheezes  CV: regular rate and rhythm, normal S1 S2, no S3 or S4, no murmur, click or rub, no peripheral edema and peripheral pulses strong  ABDOMEN: soft, nontender, no hepatosplenomegaly, no masses and bowel sounds normal  MS: no gross musculoskeletal defects noted, no edema  SKIN: no suspicious lesions " or rashes  NEURO: Normal strength and tone, mentation intact and speech normal  PSYCH: mentation appears normal, affect normal/bright    Diagnostic Test Results:  Labs reviewed in Epic    ASSESSMENT/PLAN:   1. Routine history and physical examination of adult  - reviewed PMH and health maintenance   - Lipid panel reflex to direct LDL Fasting; Future  - **Glucose FUTURE anytime; Future  - declined pap smear today, will follow up for this     2. Need for vaccination  - TD PRSERV FREE >=7 YRS ADS IM [17794]  - 1st  Administration  [41205]    3. Encounter for screening mammogram for breast cancer  - *MA Screening Digital Bilateral; Future    4. Encounter for screening for HIV  - **HIV Antigen Antibody Combo FUTURE anytime; Future    5. Cold sore  - not present today, trial as below if no improvement, pt to notify me and we can consider dermatology referral   - valACYclovir (VALTREX) 1000 mg tablet; Take 2 tablets (2,000 mg) by mouth 2 times daily for 1 day  Dispense: 4 tablet; Refill: 5      Mariama M. Lele Adams PA-C  [facility]

## 2021-04-06 ENCOUNTER — TELEPHONE (OUTPATIENT)
Dept: FAMILY MEDICINE | Facility: OTHER | Age: 51
End: 2021-04-06

## 2021-04-06 NOTE — TELEPHONE ENCOUNTER
Anahi was seen in [place] ER for finger fracture and leg injury (XR normal).  Do you want to see her or have her follow-up with ortho?   Left a message to confirm appt was made in [place] and to verify registration info

## 2021-04-07 ENCOUNTER — IMMUNIZATION (OUTPATIENT)
Dept: FAMILY MEDICINE | Facility: OTHER | Age: 51
End: 2021-04-07
Attending: FAMILY MEDICINE
Payer: COMMERCIAL

## 2021-04-07 PROCEDURE — 91300 PR COVID VAC PFIZER DIL RECON 30 MCG/0.3 ML IM: CPT

## 2021-04-07 PROCEDURE — 0001A PR COVID VAC PFIZER DIL RECON 30 MCG/0.3 ML IM: CPT

## 2021-04-24 ENCOUNTER — HEALTH MAINTENANCE LETTER (OUTPATIENT)
Age: 51
End: 2021-04-24

## 2021-04-26 ENCOUNTER — IMMUNIZATION (OUTPATIENT)
Dept: FAMILY MEDICINE | Facility: OTHER | Age: 51
End: 2021-04-26
Attending: FAMILY MEDICINE
Payer: COMMERCIAL

## 2021-04-26 PROCEDURE — 91300 PR COVID VAC PFIZER DIL RECON 30 MCG/0.3 ML IM: CPT

## 2021-04-26 PROCEDURE — 0002A PR COVID VAC PFIZER DIL RECON 30 MCG/0.3 ML IM: CPT

## 2021-10-09 ENCOUNTER — HEALTH MAINTENANCE LETTER (OUTPATIENT)
Age: 51
End: 2021-10-09

## 2022-05-21 ENCOUNTER — HEALTH MAINTENANCE LETTER (OUTPATIENT)
Age: 52
End: 2022-05-21

## 2022-08-30 ENCOUNTER — VIRTUAL VISIT (OUTPATIENT)
Dept: FAMILY MEDICINE | Facility: CLINIC | Age: 52
End: 2022-08-30
Payer: COMMERCIAL

## 2022-08-30 DIAGNOSIS — N95.1 MENOPAUSAL SYNDROME (HOT FLASHES): Primary | ICD-10-CM

## 2022-08-30 PROCEDURE — 99213 OFFICE O/P EST LOW 20 MIN: CPT | Mod: 95 | Performed by: PHYSICIAN ASSISTANT

## 2022-08-30 RX ORDER — GABAPENTIN 300 MG/1
CAPSULE ORAL
Qty: 60 CAPSULE | Refills: 0 | Status: SHIPPED | OUTPATIENT
Start: 2022-08-30 | End: 2022-09-27

## 2022-08-30 NOTE — PROGRESS NOTES
Patient is being evaluated via a billable video visit.      How would you like to obtain your AVS? Qumas       Video Start Time: 3:04 PM    Assessment & Plan  appears well, discussed risk /benefits HRT and nonHRT, she chose as below  Problem List Items Addressed This Visit    None     Visit Diagnoses     Menopausal syndrome (hot flashes)    -  Primary    Relevant Medications    gabapentin (NEURONTIN) 300 MG capsule            24 minutes spent on the date of the encounter doing chart review, history and exam, documentation and further activities as noted above          Return in about 4 weeks (around 9/27/2022) for PCP Follow-up, Preventative Care Appt.    KIMBERLY Gamino  [facility]    Subjective     Presents presents to clinic today for the following health issues     HPI   Reason for visit:  Requesting HRT to kill the hot flashes, which are destroying my sleep.  Symptom onset:  Several years ago  Symptoms include:  Whole body is suddenly hot and sweaty. Wakes me up several times per night.  Symptom intensity:  Severe  Symptom progression:  Worsening  Had these symptoms before:  Yes  Has tried/received treatment for these symptoms:  Yes  Previous treatment was successful:  No  What makes it worse:  Trying to relax or sleep  What makes it better:  No  Denies hx smoking, CAD, VTE, breast cancer.    LMP 10/21  Does have uterus.  Tried black cohosh and acupuntucre which both stopped working.          Review of Systems   GYN- hot flashes as above      Objective           Vitals:  No vitals were obtained today due to virtual visit.    Physical Exam   GENERAL: Healthy, alert and no distress  EYES: Eyes grossly normal to inspection.  No discharge or erythema, or obvious scleral/conjunctival abnormalities.  RESP: No audible wheeze, cough, or visible cyanosis.  No visible retractions or increased work of breathing.    SKIN: Visible skin clear. No significant rash, abnormal  pigmentation or lesions.  NEURO: Cranial nerves grossly intact.  Mentation and speech appropriate for age.  PSYCH: Mentation appears normal, affect normal/bright, judgement and insight intact, normal speech and appearance well-groomed.              Video-Visit Details    Type of service:  Video Visit    Video End Time:3:20 PM    Originating Location (pt. Location): Home    Distant Location (provider location):  [facility]     Platform used for Video Visit: Q.ME

## 2022-08-30 NOTE — PATIENT INSTRUCTIONS
At [facility], we strive to deliver an exceptional experience to you, every time we see you. If you receive a survey, please complete it as we do value your feedback.  If you have MyChart, you can expect to receive results automatically within 24 hours of their completion.  Your provider will send a note interpreting your results as well.   If you do not have MyChart, you should receive your results in about a week by mail.    Your care team:                            Family Medicine Internal Medicine   MD Gentry Wolfe MD Shantel Branch-Fleming, MD Srinivasa Vaka, MD Katya Belousova, JOHN Chu CNP, MD ([facility]   Kendall Alvarado, MD Mariama Hung MD Amelia Massimini APRN CNP Kim Thein, APRN CNP Bethany Templen, MD             Clinic hours: Monday - Thursday 7 am-6 pm; Fridays 7 am-5 pm.   Urgent care: Monday - Friday 10 am- 8 pm; Saturday and Sunday 9 am-5 pm.    Clinic: [phone]       [facility]: Monday - Thursday 8 am - 7 pm; Friday 8 am - 6 pm  [facility]: [phone]

## 2022-09-11 ENCOUNTER — HEALTH MAINTENANCE LETTER (OUTPATIENT)
Age: 52
End: 2022-09-11

## 2022-09-27 ENCOUNTER — MYC REFILL (OUTPATIENT)
Dept: FAMILY MEDICINE | Facility: CLINIC | Age: 52
End: 2022-09-27

## 2022-09-27 DIAGNOSIS — N95.1 MENOPAUSAL SYNDROME (HOT FLASHES): ICD-10-CM

## 2022-09-28 RX ORDER — GABAPENTIN 300 MG/1
300 CAPSULE ORAL 2 TIMES DAILY
Qty: 180 CAPSULE | Refills: 0 | Status: SHIPPED | OUTPATIENT
Start: 2022-09-28 | End: 2022-11-30

## 2022-11-30 ENCOUNTER — MYC REFILL (OUTPATIENT)
Dept: FAMILY MEDICINE | Facility: CLINIC | Age: 52
End: 2022-11-30

## 2022-11-30 DIAGNOSIS — N95.1 MENOPAUSAL SYNDROME (HOT FLASHES): ICD-10-CM

## 2022-11-30 RX ORDER — GABAPENTIN 300 MG/1
300 CAPSULE ORAL 3 TIMES DAILY
Qty: 270 CAPSULE | Refills: 0 | Status: SHIPPED | OUTPATIENT
Start: 2022-11-30 | End: 2023-11-02

## 2022-12-21 ENCOUNTER — TELEPHONE (OUTPATIENT)
Dept: FAMILY MEDICINE | Facility: CLINIC | Age: 52
End: 2022-12-21

## 2022-12-21 NOTE — TELEPHONE ENCOUNTER
Patient Quality Outreach    Patient is due for the following:   Breast Cancer Screening - Mammogram    Next Steps:   Schedule a office visit for mammogram    Type of outreach:    Phone, left message for patient/parent to call back.    Next Steps:  Reach out within 90 days via [employer] (Inspect Manager).    Max number of attempts reached: Yes. Will try again in 90 days if patient still on fail list.    Questions for provider review:    None     Bob Ledezma

## 2023-02-02 ENCOUNTER — E-VISIT (OUTPATIENT)
Dept: FAMILY MEDICINE | Facility: CLINIC | Age: 53
End: 2023-02-02
Payer: COMMERCIAL

## 2023-02-02 DIAGNOSIS — N95.1 MENOPAUSAL SYNDROME (HOT FLASHES): Primary | ICD-10-CM

## 2023-02-02 PROCEDURE — 99421 OL DIG E/M SVC 5-10 MIN: CPT | Performed by: PHYSICIAN ASSISTANT

## 2023-02-03 NOTE — TELEPHONE ENCOUNTER
Provider E-Visit time total (minutes):8      17-beta estradiol (either transdermal or oral, depending upon underlying comorbidities and patient preference) and cyclic administration of oral micronized progesterone (200 mg/day for the first 12 days of each calendar month). For women with moderate symptoms, we usually start with either transdermal estradiol 0.025 mg twice weekly or oral estradiol 0.5 mg daily.

## 2023-02-06 RX ORDER — ESTRADIOL 0.5 MG/1
0.5 TABLET ORAL DAILY
Qty: 30 TABLET | Refills: 1 | Status: SHIPPED | OUTPATIENT
Start: 2023-02-06 | End: 2023-03-27

## 2023-02-06 RX ORDER — PROGESTERONE 200 MG/1
CAPSULE ORAL
Qty: 12 CAPSULE | Refills: 2 | Status: SHIPPED | OUTPATIENT
Start: 2023-02-06 | End: 2023-04-26

## 2023-02-25 DIAGNOSIS — N95.1 MENOPAUSAL SYNDROME (HOT FLASHES): ICD-10-CM

## 2023-02-27 RX ORDER — GABAPENTIN 300 MG/1
CAPSULE ORAL
Qty: 270 CAPSULE | Refills: 0 | OUTPATIENT
Start: 2023-02-27

## 2023-03-27 DIAGNOSIS — N95.1 MENOPAUSAL SYNDROME (HOT FLASHES): ICD-10-CM

## 2023-03-27 RX ORDER — ESTRADIOL 0.5 MG/1
TABLET ORAL
Qty: 30 TABLET | Refills: 1 | Status: SHIPPED | OUTPATIENT
Start: 2023-03-27 | End: 2023-05-25

## 2023-04-24 ENCOUNTER — TELEPHONE (OUTPATIENT)
Dept: FAMILY MEDICINE | Facility: CLINIC | Age: 53
End: 2023-04-24
Payer: COMMERCIAL

## 2023-04-24 NOTE — TELEPHONE ENCOUNTER
Patient Quality Outreach    Patient is due for the following:   Breast Cancer Screening - Mammogram  Cervical Cancer Screening - PAP Needed  Physical Preventive Adult Physical   Colonoscopy      Topic Date Due     Hepatitis B Vaccine (1 of 3 - 3-dose series) Never done     Zoster (Shingles) Vaccine (1 of 2) Never done     COVID-19 Vaccine (4 - Booster for Pfizer series) 03/05/2022     Flu Vaccine (1) 09/01/2022       Next Steps:   Schedule a Adult Preventative    Type of outreach:    Sent [employer] message. and Sent letter.      Questions for provider review:    None     Velma Umaña

## 2023-04-24 NOTE — LETTER
April 24, 2023    Lennie More  [address] [facility] [address]    Dear Lady,    At [facility] we care about your health and are committed to providing quality patient care.     Here is a list of Health Maintenance topics that are due now or due soon:  Health Maintenance Due   Topic Date Due    ADVANCE CARE PLANNING  Never done    MAMMO SCREENING  Never done    HEPATITIS B IMMUNIZATION (1 of 3 - 3-dose series) Never done    COLORECTAL CANCER SCREENING  Never done    PAP  Never done    LIPID  Never done    ZOSTER IMMUNIZATION (1 of 2) Never done    YEARLY PREVENTIVE VISIT  01/29/2021    COVID-19 Vaccine (4 - Booster for Pfizer series) 03/05/2022    INFLUENZA VACCINE (1) 09/01/2022    PHQ-2 (once per calendar year)  01/01/2023        We are recommending that you:  Schedule a WELLNESS (Preventative/Physical) APPOINTMENT with your primary care provider. If you go elsewhere for your wellness appointments then please disregard this reminder     Schedule a MAMMOGRAM which is due.    1 in 8 women will develop invasive breast cancer during her lifetime and it is the most common non-skin cancer in American women.  EARLY detection, new treatments, and a better understanding of the disease have increased survival rates - the 5 year survival rate in the 1960s was 63% and today it is close to 90%.    If you are under/uninsured, we recommend you contact the [facility]. They offer mammograms at no charge or on a sliding fee charge. You can schedule with them at [phone]. Please have them send us the results.      Please disregard this reminder if you have had this exam elsewhere within the last year.  It would be helpful for us to have a copy of your mammogram report in your file so that we can best coordinate your care - please contact us with when your test was done so we can update your record.    Schedule a COLONOSCOPY to assess for colon cancer (due every 10 years or 5 years  in higher risk situations.)       Colon cancer is now the second leading cause of cancer-related deaths in the [place] for both men and women and there are over 130,000 new cases and 50,000 deaths per year from colon cancer.  Colonoscopies can prevent 90-95% of these deaths.  Problem lesions can be removed before they ever become cancer.  This test is not only looking for cancer, but also getting rid of precancerious lesions.    If you are under/uninsured, we recommend you contact the [employer] program. [employer] is a free colorectal cancer screening program that provides colonoscopies for eligible under/uninsured [place] men and women. If you are interested in receiving a free colonoscopy, please call [employer] at [phone] (mention code [employer]) to see if you re eligible.     If you do not wish to do a colonoscopy or cannot afford to do one, at this time, there is another option. It is called a FIT test or Fecal Immunochemical Occult Blood Test (take home stool sample kit).  It does not replace the colonoscopy for colorectal cancer screening, but it can detect hidden bleeding in the lower colon.  It does need to be repeated every year and if a positive result is obtained, you would be referred for a colonoscopy.    If you have completed either one of these tests at another facility, please call/respond to this message with the details of when and where the tests were done and if they were normal or not. Or have the records sent to our clinic so that we can best coordinate your care.      Schedule a PAP SMEAR EXAM which is due.  Please disregard this reminder if you have had this exam elsewhere within the last year.  It would be helpful for us to have a copy of your recent pap smear report in our file so that we can best coordinate your care.    If you are under/uninsured, we recommend you contact the [facility]. They offer pap smears at no charge or on a sliding fee charge. You can schedule  with them at [phone]. Please have them send us the results.    Complete the attached Questionnaire:  You are due to complete the attached PHQ questionnaire. Please complete as soon as you are able.    Schedule a Nurse-Only appointment to update your immunizations: Your records indicate that you are not up to date with your immunizations, please schedule a nurse-only appointment to get these updated or update them at your next office visit. If this is incorrect, please disregard.    To schedule an appointment or discuss this further, you may contact us by phone at the [facility] at [phone] or online through the patient [email or web address] @ [email or web address]    Thank you for trusting [facility] and we appreciate the opportunity to serve you.  We look forward to supporting your healthcare needs in the future.    Your partners in health,      Quality Committee at [facility]

## 2023-04-26 DIAGNOSIS — N95.1 MENOPAUSAL SYNDROME (HOT FLASHES): ICD-10-CM

## 2023-04-26 RX ORDER — PROGESTERONE 200 MG/1
CAPSULE ORAL
Qty: 12 CAPSULE | Refills: 1 | Status: SHIPPED | OUTPATIENT
Start: 2023-04-26 | End: 2023-06-12

## 2023-05-25 DIAGNOSIS — N95.1 MENOPAUSAL SYNDROME (HOT FLASHES): ICD-10-CM

## 2023-05-25 RX ORDER — ESTRADIOL 0.5 MG/1
0.5 TABLET ORAL DAILY
Qty: 30 TABLET | Refills: 0 | Status: SHIPPED | OUTPATIENT
Start: 2023-05-25 | End: 2023-06-13

## 2023-06-03 ENCOUNTER — HEALTH MAINTENANCE LETTER (OUTPATIENT)
Age: 53
End: 2023-06-03

## 2023-06-12 DIAGNOSIS — N95.1 MENOPAUSAL SYNDROME (HOT FLASHES): ICD-10-CM

## 2023-06-12 RX ORDER — PROGESTERONE 200 MG/1
CAPSULE ORAL
Qty: 12 CAPSULE | Refills: 0 | Status: SHIPPED | OUTPATIENT
Start: 2023-06-12 | End: 2023-08-11

## 2023-06-13 DIAGNOSIS — N95.1 MENOPAUSAL SYNDROME (HOT FLASHES): ICD-10-CM

## 2023-06-13 RX ORDER — ESTRADIOL 0.5 MG/1
0.5 TABLET ORAL DAILY
Qty: 15 TABLET | Refills: 0 | Status: SHIPPED | OUTPATIENT
Start: 2023-06-13 | End: 2023-07-03

## 2023-06-13 RX ORDER — PROGESTERONE 200 MG/1
CAPSULE ORAL
Qty: 12 CAPSULE | Refills: 0 | Status: CANCELLED | OUTPATIENT
Start: 2023-06-13

## 2023-06-13 NOTE — TELEPHONE ENCOUNTER
[facility] phone call message- patient requests medication or medication refill:refill     If this is a refill request, has the caller requested the refill from the pharmacy already? Yes  Name of the pharmacy and phone number for the current request:  [id number] [place], [place] - [address] AT [facility] [address]    Name of the medication requested: progesterone (PROMETRIUM) 200 MG capsule      Other request:     OK to leave the result message on voice mail or with a family member? NO    par Call taken on 6/13/2023 at 11:00 AM by Elie Rodriguez MA i

## 2023-06-28 DIAGNOSIS — N95.1 MENOPAUSAL SYNDROME (HOT FLASHES): ICD-10-CM

## 2023-06-29 RX ORDER — PROGESTERONE 200 MG/1
CAPSULE ORAL
Qty: 12 CAPSULE | Refills: 0 | OUTPATIENT
Start: 2023-06-29

## 2023-07-01 ENCOUNTER — TELEPHONE (OUTPATIENT)
Dept: FAMILY MEDICINE | Facility: CLINIC | Age: 53
End: 2023-07-01
Payer: COMMERCIAL

## 2023-07-01 DIAGNOSIS — N95.1 MENOPAUSAL SYNDROME (HOT FLASHES): ICD-10-CM

## 2023-07-01 NOTE — LETTER
July 5, 2023          Lennie More  [address] [place] [id number]            Dear Lennie More,      At [facility] we care about your health and are committed to providing quality patient care. Regular appointments are a vital part of the care and management of your health and can help prevent many of the complications that can occur.      It has come to our attention that you are due for a mammogram.  Please call [facility] at [phone] soon to schedule your follow up appointment.    If you have transferred care to another clinic please call to inform us so that we do not continue to send you reminder letters.      Sincerely,      [facility] Care Team

## 2023-07-03 RX ORDER — ESTRADIOL 0.5 MG/1
TABLET ORAL
Qty: 15 TABLET | Refills: 0 | Status: SHIPPED | OUTPATIENT
Start: 2023-07-03 | End: 2023-07-17

## 2023-07-03 NOTE — TELEPHONE ENCOUNTER
This writer attempted to contact patient on 07/03/23      Reason for call provider message and left message.      If patient calls back:   Provider would like pt to schedule mammogram. Provider mammogram scheduling number: [phone].         Adri Wei RN

## 2023-07-03 NOTE — TELEPHONE ENCOUNTER
Please contact patient as per unread [employer] message/result note:    Please contact patient about scheduling mammogram    Kendall Alvarado PA-C

## 2023-07-17 DIAGNOSIS — N95.1 MENOPAUSAL SYNDROME (HOT FLASHES): ICD-10-CM

## 2023-07-17 RX ORDER — ESTRADIOL 0.5 MG/1
TABLET ORAL
Qty: 30 TABLET | Refills: 0 | Status: SHIPPED | OUTPATIENT
Start: 2023-07-17 | End: 2023-08-14

## 2023-07-20 ENCOUNTER — ANCILLARY PROCEDURE (OUTPATIENT)
Dept: MAMMOGRAPHY | Facility: CLINIC | Age: 53
End: 2023-07-20
Payer: COMMERCIAL

## 2023-07-20 DIAGNOSIS — Z12.31 SCREENING MAMMOGRAM FOR BREAST CANCER: ICD-10-CM

## 2023-07-20 DIAGNOSIS — Z12.31 VISIT FOR SCREENING MAMMOGRAM: ICD-10-CM

## 2023-07-20 PROCEDURE — 77067 SCR MAMMO BI INCL CAD: CPT | Mod: TC | Performed by: RADIOLOGY

## 2023-08-11 ENCOUNTER — MYC MEDICAL ADVICE (OUTPATIENT)
Dept: FAMILY MEDICINE | Facility: CLINIC | Age: 53
End: 2023-08-11
Payer: COMMERCIAL

## 2023-08-11 DIAGNOSIS — N95.1 MENOPAUSAL SYNDROME (HOT FLASHES): ICD-10-CM

## 2023-08-11 RX ORDER — PROGESTERONE 200 MG/1
CAPSULE ORAL
Qty: 36 CAPSULE | Refills: 0 | Status: SHIPPED | OUTPATIENT
Start: 2023-08-11 | End: 2023-11-02

## 2023-08-11 NOTE — TELEPHONE ENCOUNTER
Routing to refill pool.    Kristina Kjellberg, MSN, RN  [facility] Triage

## 2023-08-14 DIAGNOSIS — N95.1 MENOPAUSAL SYNDROME (HOT FLASHES): ICD-10-CM

## 2023-08-14 RX ORDER — ESTRADIOL 0.5 MG/1
TABLET ORAL
Qty: 30 TABLET | Refills: 0 | Status: SHIPPED | OUTPATIENT
Start: 2023-08-14 | End: 2023-09-13

## 2023-09-12 DIAGNOSIS — N95.1 MENOPAUSAL SYNDROME (HOT FLASHES): ICD-10-CM

## 2023-09-13 PROBLEM — N95.1 MENOPAUSAL SYNDROME (HOT FLASHES): Status: ACTIVE | Noted: 2023-09-13

## 2023-09-13 RX ORDER — ESTRADIOL 0.5 MG/1
0.5 TABLET ORAL DAILY
Qty: 30 TABLET | Refills: 0 | Status: SHIPPED | OUTPATIENT
Start: 2023-09-13 | End: 2023-10-30

## 2023-10-30 ENCOUNTER — MYC REFILL (OUTPATIENT)
Dept: FAMILY MEDICINE | Facility: CLINIC | Age: 53
End: 2023-10-30
Payer: COMMERCIAL

## 2023-10-30 DIAGNOSIS — N95.1 MENOPAUSAL SYNDROME (HOT FLASHES): ICD-10-CM

## 2023-10-30 RX ORDER — ESTRADIOL 0.5 MG/1
0.5 TABLET ORAL DAILY
Qty: 15 TABLET | Refills: 0 | Status: SHIPPED | OUTPATIENT
Start: 2023-10-30 | End: 2023-11-02

## 2023-11-02 ENCOUNTER — VIRTUAL VISIT (OUTPATIENT)
Dept: INTERNAL MEDICINE | Facility: CLINIC | Age: 53
End: 2023-11-02
Payer: COMMERCIAL

## 2023-11-02 DIAGNOSIS — N95.1 MENOPAUSAL SYNDROME (HOT FLASHES): Primary | ICD-10-CM

## 2023-11-02 DIAGNOSIS — N95.1 MENOPAUSAL SYNDROME (HOT FLASHES): ICD-10-CM

## 2023-11-02 PROCEDURE — 99213 OFFICE O/P EST LOW 20 MIN: CPT | Mod: 95 | Performed by: INTERNAL MEDICINE

## 2023-11-02 RX ORDER — PROGESTERONE 200 MG/1
CAPSULE ORAL
Qty: 36 CAPSULE | Refills: 0 | OUTPATIENT
Start: 2023-11-02

## 2023-11-02 RX ORDER — PROGESTERONE 200 MG/1
CAPSULE ORAL
Qty: 36 CAPSULE | Refills: 4 | Status: SHIPPED | OUTPATIENT
Start: 2023-11-02

## 2023-11-02 RX ORDER — ESTRADIOL 0.5 MG/1
0.5 TABLET ORAL DAILY
Qty: 90 TABLET | Refills: 3 | Status: SHIPPED | OUTPATIENT
Start: 2023-11-02

## 2023-11-02 NOTE — PROGRESS NOTES
Lady is a 53 year old who is being evaluated via a billable video visit.      How would you like to obtain your AVS? MyChart  If the video visit is dropped, the invitation should be resent by: Text to cell phone: [phone]  Will anyone else be joining your video visit? No    Assessment & Plan   Menopausal syndrome (hot flashes)  Refilled chronic medications at current dose. Discussed I'm admittedly unfamiliar with specifics of HRT management but agreed to refill at current dose. Encouraged her to make an in-person appointment with a provider comfortable with managing HRT prior to her need for future refills so she can discuss plan for eventual stopping of this regimen in the future.  - estradiol (ESTRACE) 0.5 MG tablet; Take 1 tablet (0.5 mg) by mouth daily  - progesterone (PROMETRIUM) 200 MG capsule; TAKE ONE CAPSULE BY MOUTH EVERY DAY THE FIRST 12 DAYS OF THE MONTH.    F/u with regular PCP at regular interval or sooner RACIEL Kwan MD  [facility]    Subjective   Lady is a 53 year old who presents for a same day acute care virtual video visit with chief concern of:  Menopausal Sx  This is the first time I have met Lady, who typically sees another provider for her ongoing care.    Would like refills on HRT. Reports she is tolerating well. Has been on it for ~1 year. Controlling symptoms. Would like to continue.    Review of Systems   Constitutional system negative, except as otherwise noted.      Objective       Vitals: No vitals were obtained today due to virtual visit.    Physical Exam   GENERAL: Healthy, alert and no distress  EYES: Eyes grossly normal to inspection.  No discharge or erythema, or obvious scleral/conjunctival abnormalities.  RESP: No audible wheeze, cough, or visible cyanosis.  No visible retractions or increased work of breathing.    SKIN: Visible skin clear. No significant rash, abnormal pigmentation or lesions.  NEURO: Cranial nerves grossly intact.   Mentation and speech appropriate for age.  PSYCH: Mentation appears normal, affect normal/bright, judgement and insight intact, normal speech and appearance well-groomed.    Video-Visit Details  Type of service: Video Visit   Originating Location (pt. Location): Home  Distant Location (provider location):  On-site  Platform used for Video Visit: Doximity (there was significant audio difficulty during the video portion of today's exam and after a few minutes this was converted to an audio visit for the remainder)

## 2024-07-13 ENCOUNTER — HEALTH MAINTENANCE LETTER (OUTPATIENT)
Age: 54
End: 2024-07-13

## 2024-07-24 DIAGNOSIS — N95.1 MENOPAUSAL SYNDROME (HOT FLASHES): ICD-10-CM

## 2024-07-24 RX ORDER — ESTRADIOL 0.5 MG/1
0.5 TABLET ORAL DAILY
Qty: 90 TABLET | Refills: 3 | OUTPATIENT
Start: 2024-07-24

## 2024-08-29 ENCOUNTER — ANCILLARY PROCEDURE (OUTPATIENT)
Dept: GENERAL RADIOLOGY | Facility: CLINIC | Age: 54
End: 2024-08-29
Attending: EMERGENCY MEDICINE
Payer: COMMERCIAL

## 2024-08-29 ENCOUNTER — OFFICE VISIT (OUTPATIENT)
Dept: URGENT CARE | Facility: URGENT CARE | Age: 54
End: 2024-08-29
Payer: COMMERCIAL

## 2024-08-29 VITALS
OXYGEN SATURATION: 98 % | TEMPERATURE: 98.4 F | SYSTOLIC BLOOD PRESSURE: 154 MMHG | DIASTOLIC BLOOD PRESSURE: 90 MMHG | HEART RATE: 84 BPM

## 2024-08-29 DIAGNOSIS — J06.9 UPPER RESPIRATORY TRACT INFECTION, UNSPECIFIED TYPE: Primary | ICD-10-CM

## 2024-08-29 DIAGNOSIS — J06.9 UPPER RESPIRATORY TRACT INFECTION, UNSPECIFIED TYPE: ICD-10-CM

## 2024-08-29 LAB
DEPRECATED S PYO AG THROAT QL EIA: NEGATIVE
GROUP A STREP BY PCR: NOT DETECTED
SARS-COV-2 RNA RESP QL NAA+PROBE: NEGATIVE

## 2024-08-29 PROCEDURE — 71046 X-RAY EXAM CHEST 2 VIEWS: CPT | Mod: TC | Performed by: RADIOLOGY

## 2024-08-29 PROCEDURE — 87651 STREP A DNA AMP PROBE: CPT | Performed by: EMERGENCY MEDICINE

## 2024-08-29 PROCEDURE — 87635 SARS-COV-2 COVID-19 AMP PRB: CPT | Performed by: EMERGENCY MEDICINE

## 2024-08-29 PROCEDURE — 99214 OFFICE O/P EST MOD 30 MIN: CPT | Performed by: EMERGENCY MEDICINE

## 2024-08-29 RX ORDER — BENZONATATE 100 MG/1
100 CAPSULE ORAL 3 TIMES DAILY PRN
Qty: 15 CAPSULE | Refills: 0 | Status: SHIPPED | OUTPATIENT
Start: 2024-08-29

## 2024-08-29 NOTE — PROGRESS NOTES
Assessment & Plan     Diagnosis:    ICD-10-CM    1. Upper respiratory tract infection, unspecified type  J06.9 XR Chest 2 Views     Symptomatic COVID-19 Virus (Coronavirus) by PCR Nose     Streptococcus A Rapid Screen w/Reflex to PCR - Clinic Collect     Group A Streptococcus PCR Throat Swab     benzonatate (TESSALON) 100 MG capsule        Medical Decision Making:  Lennie NÚÑEZ  is a 54 year old female who presents for evaluation of cough, sore throat, lightheadedness.  This is consistent with an upper respiratory tract infection.  There is no signs at this point of serious bacterial infection such as OM, RPA, epiglottitis, PTA, strep pharyngitis, pneumonia, meningitis.    Given worsening cough, I did a CXR to eval for pneumonia. This shows no acute infiltrate or effusion on my read, radiology notes as below. Tessalon Perles for bronchospasm.  Suspect viral bronchitis and discussed expected course of illness.    There are no gastrointestinal symptoms at this point and no signs of dehydration.  Close followup with PCP is indicated.  Go to ED for fever > 102 F, protracted vomiting, worsening shortness of breath, chest pain or other concerns.  Patient verbalized understanding and agreement with the plan was discharged in stable condition.      Quintin Prajapati PA-C  [facility]    Subjective     Lennie NÚÑEZ  is a 54 year old female who presents to clinic today for the following health issues:  Chief Complaint   Patient presents with    Urgent Care     Runny nose, coughing, ache in chest, lightheaded from time to time, fatigue x1 week.  Home COVID negative.         HPI  Patient reports that she has been experiencing cough, chest, lightheadedness, runny nose for the last week.  Home COVID test was negative.  Feels little more short of breath today, having coughing fits at times.  Denies any difficulties breathing while at rest or with minimal exertion.  She has a slight sore throat, but no  difficulty swallowing.  No ear pain, chest pain, nausea, vomiting, diarrhea, new fevers or other concerns.    Review of Systems    See HPI    Objective      Vitals: BP (!) 154/90   Pulse 84   Temp 98.4  F (36.9  C) (Tympanic)   SpO2 98%   Resp: 20    Patient Vitals for the past 24 hrs:   BP Temp Temp src Pulse SpO2   08/29/24 1427 (!) 154/90 98.4  F (36.9  C) Tympanic 84 98 %       Vital signs reviewed by: Quintin Prajapati PA-C    Physical Exam   Constitutional: Patient is alert and cooperative. No acute distress.  Ears: Right TM is normal. Left TM is normal. External ear canals are normal.  Mouth: Mucous membranes are moist. Normal tongue and tonsil. Posterior oropharynx is clear.  Cardiovascular: Regular rate and rhythm  Pulmonary/Chest: Rhonchi in the lower right lung field.  No rales or wheezing.  Effort normal.  Speaking full sentences, no respiratory distress.  Skin: No rash noted on visualized skin.  Psychiatric:The patient has a normal mood and affect.     Labs/Imaging:  Results for orders placed or performed in visit on 08/29/24   XR Chest 2 Views     Status: None    Narrative    CHEST TWO VIEWS  8/29/2024 3:02 PM     HISTORY:  Upper respiratory tract infection, unspecified type.    COMPARISON: None.      Impression    IMPRESSION: Negative chest. Lungs clear.    TOSHA GARCIA MD         SYSTEM ID:  [id number]   Results for orders placed or performed in visit on 08/29/24   Streptococcus A Rapid Screen w/Reflex to PCR - Clinic Collect     Status: Normal    Specimen: Throat; Swab   Result Value Ref Range    Group A Strep antigen Negative Negative       Quintin Prajapati PA-C, August 29, 2024

## 2024-10-11 ENCOUNTER — OFFICE VISIT (OUTPATIENT)
Dept: FAMILY MEDICINE | Facility: CLINIC | Age: 54
End: 2024-10-11
Payer: COMMERCIAL

## 2024-10-11 VITALS
TEMPERATURE: 97.7 F | OXYGEN SATURATION: 99 % | HEART RATE: 82 BPM | SYSTOLIC BLOOD PRESSURE: 148 MMHG | DIASTOLIC BLOOD PRESSURE: 72 MMHG

## 2024-10-11 DIAGNOSIS — H57.89 IRRITATION OF LEFT EYE: Primary | ICD-10-CM

## 2024-10-11 PROCEDURE — 99213 OFFICE O/P EST LOW 20 MIN: CPT

## 2024-10-11 NOTE — PROGRESS NOTES
URGENT CARE    ASSESSMENT AND PLAN:      ICD-10-CM    1. Irritation of left eye  H57.89 Adult Eye  Referral          Differential Diagnosis include but not limited to allergic/viral/bacterial conjunctivitis, Stye (external/internal),Corneal abrasion, Foreign body, Preseptal or periorbital cellulitis, Blepharitis, Iritis, etc. Tarango Lamp used during visit today with no abrasion or foreign body identified.  I have asked patient to continue to monitor symptoms and if continued to follow-up with ophthalmology.  Ophthalmology referral was placed.    Red flag symptoms needing urgent evaluation was discussed with patient.    Patient verbalized understanding and is agreeable to plan. The patient was discharged ambulatory and in stable condition.    Chief Complaint   Patient presents with    Urgent Care    Eye Problem     Left eye- pt feels like there is something in her eye and has been feeling scratchy and pressure     SUBJECTIVE:  Lennie NÚÑEZ  is a 54 year old female who presents  complaining of intermittent mild - moderate irritation, scratchy, and pressure sensations in left eye for 3 - 4 day(s).   Patient denies trauma/injury, known foreign body, pain, floaters, change in vision and light sensitivity.   Onset/timing: intermittent.    Associated Signs and Symptoms: none  Treatment measures tried include: none  Contact wearer: No    History reviewed. No pertinent past medical history.  Current Outpatient Medications   Medication Sig Dispense Refill    calcium carbonate (OS-ANGEL 500 MG [place]. [place]) 1250 MG tablet Take 2 tablets by mouth 2 times daily      estradiol (ESTRACE) 0.5 MG tablet Take 1 tablet (0.5 mg) by mouth daily 90 tablet 3    Multiple Vitamin (MULTI-VITAMINS) TABS       benzonatate (TESSALON) 100 MG capsule Take 1 capsule (100 mg) by mouth 3 times daily as needed for cough. (Patient not taking: Reported on 10/11/2024) 15 capsule 0    progesterone (PROMETRIUM) 200 MG capsule TAKE ONE CAPSULE BY  MOUTH EVERY DAY THE FIRST 12 DAYS OF THE MONTH. (Patient not taking: Reported on 10/11/2024) 36 capsule 4     No current facility-administered medications for this visit.     Social History     Tobacco Use    Smoking status: Never    Smokeless tobacco: Never   Substance Use Topics    Alcohol use: Yes     Alcohol/week: 1.0 standard drink of alcohol     Types: 1 Cans of beer per week     Comment: one a week      No Known Allergies    Review of Systems  All systems reviewed and negative except per HPI.    OBJECTIVE:  BP (!) 148/72   Pulse 82   Temp 97.7  F (36.5  C) (Tympanic)   SpO2 99%     Physical Exam    GENERAL: alert and no distress  EYES: Eyes grossly normal to inspection, PERRL and conjunctivae and sclerae normal  HENT: ear canals and TM's normal, nose and mouth without ulcers or lesions  NECK: no adenopathy, no asymmetry, masses, or scars  RESP: lungs clear to auscultation - no rales, rhonchi or wheezes  CV: regular rate and rhythm, normal S1 S2, no S3 or S4, no murmur, click or rub, no peripheral edema  SKIN: no suspicious lesions or rashes

## 2024-10-22 ENCOUNTER — E-VISIT (OUTPATIENT)
Dept: INTERNAL MEDICINE | Facility: CLINIC | Age: 54
End: 2024-10-22
Payer: COMMERCIAL

## 2024-10-22 DIAGNOSIS — N95.1 MENOPAUSAL SYNDROME (HOT FLASHES): Primary | ICD-10-CM

## 2024-10-22 PROCEDURE — 99207 PR NON-BILLABLE SERV PER CHARTING: CPT | Performed by: INTERNAL MEDICINE

## 2024-10-22 NOTE — PATIENT INSTRUCTIONS
Thank you for choosing us for your care. Please schedule a clinic appointment with a provider more comfortable managing hormone-replacement therapy; you won t be charged for this eVisit.      You can schedule an appointment by clicking here in [employer], or call [phone].

## 2024-10-23 PROBLEM — R03.0 ELEVATED BLOOD PRESSURE READING WITHOUT DIAGNOSIS OF HYPERTENSION: Status: ACTIVE | Noted: 2024-10-23

## 2024-10-24 ENCOUNTER — VIRTUAL VISIT (OUTPATIENT)
Dept: INTERNAL MEDICINE | Facility: CLINIC | Age: 54
End: 2024-10-24
Payer: COMMERCIAL

## 2024-10-24 DIAGNOSIS — N95.1 MENOPAUSAL SYNDROME (HOT FLASHES): Primary | ICD-10-CM

## 2024-10-24 DIAGNOSIS — Z13.220 SCREENING CHOLESTEROL LEVEL: ICD-10-CM

## 2024-10-24 DIAGNOSIS — R03.0 ELEVATED BLOOD PRESSURE READING WITHOUT DIAGNOSIS OF HYPERTENSION: ICD-10-CM

## 2024-10-24 PROCEDURE — G2211 COMPLEX E/M VISIT ADD ON: HCPCS | Mod: 95 | Performed by: NURSE PRACTITIONER

## 2024-10-24 PROCEDURE — 99214 OFFICE O/P EST MOD 30 MIN: CPT | Mod: 95 | Performed by: NURSE PRACTITIONER

## 2024-10-24 RX ORDER — ESTRADIOL 1 MG/1
1 TABLET ORAL DAILY
Qty: 90 TABLET | Refills: 1 | Status: SHIPPED | OUTPATIENT
Start: 2024-10-24

## 2024-10-24 RX ORDER — PROGESTERONE 100 MG/1
100 CAPSULE ORAL DAILY
Qty: 90 CAPSULE | Refills: 1 | Status: SHIPPED | OUTPATIENT
Start: 2024-10-24

## 2024-10-24 RX ORDER — ESTRADIOL 0.5 MG/1
0.5 TABLET ORAL DAILY
Qty: 90 TABLET | Refills: 3 | Status: CANCELLED | OUTPATIENT
Start: 2024-10-24

## 2024-10-24 NOTE — PROGRESS NOTES
Lady is a 54 year old who is being evaluated via a billable video visit.    How would you like to obtain your AVS? MyChart  If the video visit is dropped, the invitation should be resent by: Text to cell phone: [phone]  Will anyone else be joining your video visit? No    Assessment & Plan     (N95.1) Menopausal syndrome (hot flashes)  (primary encounter diagnosis)  Comment: Increase Estrace to 1 mg once daily to help further reduce hot flash burden.  Change progesterone to 100 mg once daily at bedtime.  Discussed that IUD such as Mirena could be placed if she continues to have adverse effects with oral progesterone.  Labs ordered for monitoring.  Plan: estradiol (ESTRACE) 1 MG tablet, progesterone         (PROMETRIUM) 100 MG capsule    (R03.0) Elevated blood pressure reading without diagnosis of hypertension  Comment: Home blood pressures, given 60 pound weight loss in the last 4 years and regular physical activity, are likely at goal.  Clinic readings have a good reason for being elevated, given that they were taken in the context of her feeling unwell and with elevated stress level.  Discussed home blood pressure checks; see AVS for details.  Will follow over time.  Plan: Comprehensive metabolic panel    (Z13.220) Screening cholesterol level  Comment: Labs ordered for screening.  Plan: Lipid panel reflex to direct LDL Fasting     See Patient Instructions    The longitudinal plan of care for the diagnosis(es)/condition(s) as documented were addressed during this visit. Due to the added complexity in care, I will continue to support Lady in the subsequent management and with ongoing continuity of care.        Subjective   Lady is a 54 year old, presenting for the following health issues:  Recheck Medication    History of Present Illness       Reason for visit:  As listed at least 3 other places: menopausal hot flashes    She eats 0-1 servings of fruits and vegetables daily.She consumes 0 sweetened beverage(s)  daily.She exercises with enough effort to increase her heart rate 30 to 60 minutes per day.  She exercises with enough effort to increase her heart rate 7 days per week.   She is taking medications regularly.     Lday is requesting refill of estradiol for management of perimenopausal hot flashes.  She does have a uterus and has been prescribed progesterone, but she has not been taking it because it seems to worsen hot flashes.  Current progesterone prescription is for 200 mg on the first 12 days of each month.  On current therapy, she continues to have about 6 hot flashes during the day and 2-3 at night. However, hot flashes are not as intense as when she was not on hormone replacement therapy.  Discussed risks of taking unopposed estrogen given that she has a uterus.  She is willing to start progesterone again.  Discussed taking a different dose to see if that helps with symptom control.    No history of HTN.  Two most recent visits with elevated blood pressure in the context of illness and a sick cat, who did end up passing away.  She does not check her blood pressure at home, but she jogs and uses her home treadmill regularly.  She has lost 60 pounds in the last 4 years, with current weight being 140 pounds.  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    She has no other pertinent or significant health history that would impact prescription of hormone replacement therapy for perimenopausal symptoms.    Review of Systems  Constitutional, HEENT, cardiovascular, pulmonary, GI, , musculoskeletal, neuro, skin, endocrine and psych systems are negative, except as otherwise noted.      Objective    Vitals - Patient Reported  Weight (Patient Reported): 63.5 kg (140 lb)      Vitals:  No vitals were obtained today due to virtual visit.    Physical Exam   GENERAL: alert and no distress  EYES: Eyes grossly normal to inspection.  No discharge or erythema, or obvious scleral/conjunctival abnormalities.  RESP: No  audible wheeze, cough, or visible cyanosis.    SKIN: Visible skin clear. No significant rash, abnormal pigmentation or lesions.  NEURO: Cranial nerves grossly intact.  Mentation and speech appropriate for age.  PSYCH: Appropriate affect, tone, and pace of words        Video-Visit Details    Type of service:  Video Visit   Originating Location (pt. Location): Home  Distant Location (provider location):  On-site  Platform used for Video Visit: Dottie  Signed Electronically by: JOHN Schroeder CNP

## 2024-10-24 NOTE — PATIENT INSTRUCTIONS
Hot flashes:  - Increase the estrodial (Estradiol) to 1 mg daily to further reduce hot flash burden.  (Prescription has been sent to your pharmacy)  Dose increase is associated with breast soreness; we know if this is very bothersome.    - Start taking progesterone 100 mg once daily.  Take at bedtime because some women find that it can make them feel sleepy.  (Prescription has been sent to your pharmacy)    Some women experience mood symptoms and bloating with progestin therapy; let me know if this is bothersome.    Some women do also have vaginal bleeding with combined estrogen-progestin hormone replacement therapy.  However, this is more common with the cyclic progesterone dosing you were previously taking.  If you cannot tolerate oral progesterone, an intrauterine IUD with progesterone, such as the Mirena, could be placed.  This is a common way to do hormone replacement therapy when people are having adverse effects of oral progesterone.    2) Blood pressure:  Blood pressure cuffs may not be covered by your insurance. A decent wrist BP cuff is around $20-$30 on [employer] or at local retailers, and an upper arm cuff is typically $30-$40. Upper arm cuff is generally more accurate, but the wrist cuff is fine if that is more affordable.     Once you have a blood pressure cuff, check your blood pressure at least once weekly. Measure your blood pressure first thing in the morning, 30 minutes before you have had any caffeine, nicotine, or exercise, and after sitting calmly for 3-5 minutes. Ensure that your bladder is not full. Avoid talking during blood pressure measurement. Both feet should be flat on the ground and arm resting on the table (upper arm cuff only) or wrist over the heart (wrist cuff only).   Keep a log of your blood pressure readings.   Goal is less than 130/80.    BP logs for home monitoring can be printed from this website:  [email or web address]-blood-[email or web address]-log.[email or web address]     3. Because hormone replacement therapy can impact your cholesterol, I have ordered labs to check your levels.  I have also ordered a metabolic panel, which includes liver function, kidney function, electrolytes, and a blood sugar, since I don't see a recent one.    Lab draw should be FASTING.  Make a lab appointment with [facility].  Do not eat or drink anything except for water for 8-12 hours prior to the blood test. You can eat as soon as the lab draw has been completed.   If your labs are normal, stable, or mildly abnormal, I will send you a letter through mail or [employer].  If they are abnormal, we will call you to discuss next steps. I may ask you to come to clinic to discuss further, depending on results.

## 2024-12-26 ENCOUNTER — OFFICE VISIT (OUTPATIENT)
Dept: URGENT CARE | Facility: CLINIC | Age: 54
End: 2024-12-26
Payer: COMMERCIAL

## 2024-12-26 VITALS
TEMPERATURE: 97.8 F | RESPIRATION RATE: 16 BRPM | SYSTOLIC BLOOD PRESSURE: 136 MMHG | HEART RATE: 71 BPM | WEIGHT: 146 LBS | DIASTOLIC BLOOD PRESSURE: 98 MMHG | OXYGEN SATURATION: 98 %

## 2024-12-26 DIAGNOSIS — H65.92 LEFT SEROUS OTITIS MEDIA, UNSPECIFIED CHRONICITY: ICD-10-CM

## 2024-12-26 DIAGNOSIS — J01.00 SUBACUTE MAXILLARY SINUSITIS: Primary | ICD-10-CM

## 2024-12-26 PROCEDURE — 99203 OFFICE O/P NEW LOW 30 MIN: CPT | Performed by: FAMILY MEDICINE

## 2024-12-26 RX ORDER — AMOXICILLIN AND CLAVULANATE POTASSIUM 875; 125 MG/1; MG/1
1 TABLET, FILM COATED ORAL 2 TIMES DAILY
Qty: 14 TABLET | Refills: 0 | Status: SHIPPED | OUTPATIENT
Start: 2024-12-26 | End: 2024-12-30 | Stop reason: ALTCHOICE

## 2024-12-26 ASSESSMENT — ENCOUNTER SYMPTOMS
BACK PAIN: 0
DYSURIA: 0
SORE THROAT: 0
FEVER: 0
CHILLS: 0
COUGH: 0
COLOR CHANGE: 0
SINUS PAIN: 1
HEMATURIA: 0
SEIZURES: 0
ABDOMINAL PAIN: 0
PALPITATIONS: 0
SHORTNESS OF BREATH: 0
VOMITING: 0
EYE PAIN: 0
ARTHRALGIAS: 0

## 2024-12-26 ASSESSMENT — PAIN SCALES - GENERAL: PAINLEVEL_OUTOF10: 4

## 2024-12-26 NOTE — PROGRESS NOTES
Geno Sy  is a 54 y.o. female who presents for left ear pain and some left maxillary sinus pain.  She does have pain in the ear for the last several days.  She has pain in her upper left maxillary sinus and tooth pain.  No significant fevers or chills.  No trauma or injury.  No difficulty breathing, cough or shortness of breath.      The following have been reviewed and updated as appropriate in this visit:          No Known Allergies  Current Outpatient Medications   Medication Sig Dispense Refill    ESTRADIOL ORAL Take by mouth      progesterone 25 mg capsule Take 1 capsule (25 mg total) by mouth nightly      amoxicillin-pot clavulanate (Augmentin) 875-125 mg per tablet Take 1 tablet by mouth 2 (two) times a day for 7 days 14 tablet 0     No current facility-administered medications for this visit.     No past medical history on file.  No past surgical history on file.  No family history on file.  Social History     Socioeconomic History    Marital status: Single     Social Drivers of Health     Tobacco Use: Low Risk  (10/24/2024)    Received from [facility]    Patient History     Smoking Tobacco Use: Never     Smokeless Tobacco Use: Never   Alcohol Use: Not At Risk (1/29/2020)    Received from [facility]    AUDIT-C     Frequency of Alcohol Consumption: 4 or more times a week     Average Number of Drinks: 1 or 2     Frequency of Binge Drinking: Never   Financial Resource Strain: Low Risk  (11/2/2023)    Received from [facility]    Financial Resource Strain     Within the past 12 months, have you or your family members you live with been unable to get utilities (heat, electricity) when it was really needed?: No   Food Insecurity: Low Risk  (11/2/2023)    Received from [facility]    Food Insecurity     Within the past 12 months, did you worry that your food would run out before you got money to buy more?: No     Within the past 12 months, did the food you bought just not last and you didn’t have money  to get more?: No   Transportation Needs: Low Risk  (11/2/2023)    Received from [facility]    Transportation Needs     Within the past 12 months, has lack of transportation kept you from medical appointments, getting your medicines, non-medical meetings or appointments, work, or from getting things that you need?: No   Depression: Not at risk (10/24/2024)    Received from [facility]    PHQ-2     PHQ-2 Score: 1   Housing Stability: Low Risk  (11/2/2023)    Received from [facility]    Housing Stability     Do you have housing? (Housing is defined as stable permanent housing and does not include staying ouside in a car, in a tent, in an abandoned building, in an overnight shelter, or couch-surfing.): Yes     Are you worried about losing your housing?: No       Review of Systems   Constitutional:  Negative for chills and fever.   HENT:  Positive for ear pain and sinus pain. Negative for sore throat.    Eyes:  Negative for pain and visual disturbance.   Respiratory:  Negative for cough and shortness of breath.    Cardiovascular:  Negative for chest pain and palpitations.   Gastrointestinal:  Negative for abdominal pain and vomiting.   Genitourinary:  Negative for dysuria and hematuria.   Musculoskeletal:  Negative for arthralgias and back pain.   Skin:  Negative for color change and rash.   Neurological:  Negative for seizures and syncope.   All other systems reviewed and are negative.      Objective   /98 (BP Location: Right arm, Patient Position: Sitting, Cuff Size: Regular Adult)   Pulse 71   Temp 36.6 °C (97.8 °F) (Skin)   Resp 16   Wt 66.2 kg (146 lb)   SpO2 98%     Physical Exam  Vitals and nursing note reviewed.   Constitutional:       General: She is not in acute distress.     Appearance: She is well-developed.   HENT:      Head: Normocephalic and atraumatic.      Left Ear: A middle ear effusion is present. Tympanic membrane has decreased mobility.      Ears:      Comments: Fluid behind the TM.  Mild  erythematous.     Nose:      Left Sinus: Maxillary sinus tenderness present.   Eyes:      Conjunctiva/sclera: Conjunctivae normal.      Pupils: Pupils are equal, round, and reactive to light.   Cardiovascular:      Rate and Rhythm: Normal rate and regular rhythm.      Heart sounds: Normal heart sounds. No murmur heard.  Pulmonary:      Effort: Pulmonary effort is normal. No respiratory distress.      Breath sounds: Normal breath sounds.   Abdominal:      General: Bowel sounds are normal.      Palpations: Abdomen is soft.      Tenderness: There is no abdominal tenderness.   Musculoskeletal:         General: Normal range of motion.      Cervical back: Normal range of motion and neck supple.   Skin:     General: Skin is warm and dry.      Capillary Refill: Capillary refill takes less than 2 seconds.   Neurological:      Mental Status: She is alert and oriented to person, place, and time.   Psychiatric:         Behavior: Behavior normal.          Procedures     No results found for this or any previous visit (from the past 24 hours).    Assessment/Plan     54-year-old presents with left ear pain and some sinus pain.  Patient is seen and examined.  Examination detailed above and significant for a 54-year-old who is in no acute distress.  Lungs clear, heart regular, abdomen soft.  She does have tenderness to the left maxillary sinus.  Fluid behind the left TM.  Mild erythema.  Neck is soft and supple.  Patient be treated for sinusitis and osteoarthritis.  Augmentin.  Nasal steroid such as Flonase or Nasacort.  Sinus rinses.  Other symptomatic cares.  Follow-up as needed       Diagnosis Plan   1. Subacute maxillary sinusitis  amoxicillin-pot clavulanate (Augmentin) 875-125 mg per tablet      2. Left serous otitis media, unspecified chronicity          Patient Instructions   Antibiotics twice daily as prescribed.  Sinus rinses such as [place] pot.  Sinus nasal sprays such as Nasacort or Flonase.  Motrin/Tylenol as needed.   Follow-up if symptoms worsen or do not improve, otherwise needed    Landon Rider MD

## 2024-12-26 NOTE — PATIENT INSTRUCTIONS
Antibiotics twice daily as prescribed.  Sinus rinses such as [place] pot.  Sinus nasal sprays such as Nasacort or Flonase.  Motrin/Tylenol as needed.  Follow-up if symptoms worsen or do not improve, otherwise needed

## 2024-12-28 ENCOUNTER — TELEPHONE (OUTPATIENT)
Dept: URGENT CARE | Facility: CLINIC | Age: 54
End: 2024-12-28

## 2024-12-28 NOTE — TELEPHONE ENCOUNTER
Pt. Is having stomach issues (diarrhea)from the amoxicillin. Would like to have a different one. Please call for advise.  Would like the new script send to [facility] in [place], [place] on [facility]

## 2024-12-29 NOTE — TELEPHONE ENCOUNTER
Having terrible Diarrhea from the Augmentin she received 12/26 for a sinus infection. She is traveling and would like a different rx sent to [facility] in [place], [place]

## 2024-12-30 DIAGNOSIS — J01.90 ACUTE NON-RECURRENT SINUSITIS, UNSPECIFIED LOCATION: Primary | ICD-10-CM

## 2024-12-30 RX ORDER — AMOXICILLIN 500 MG/1
500 CAPSULE ORAL 2 TIMES DAILY
Qty: 20 CAPSULE | Refills: 0 | Status: SHIPPED | OUTPATIENT
Start: 2024-12-30 | End: 2025-01-09

## 2024-12-30 NOTE — TELEPHONE ENCOUNTER
Pt calling again to see about getting a new prescription sent to the [facility]. Please call.

## 2024-12-30 NOTE — PROGRESS NOTES
Called the patient.  She is placed on Augmentin for an ear infection and sinus infection.  She is not tolerating it and having terrible diarrhea.  We will place her on just amoxicillin 500 mg twice daily for 10 days.  She will start a probiotic.  She will follow-up as needed.  This was sent to a pharmacy in [place] per her recommendation and desire

## 2024-12-30 NOTE — TELEPHONE ENCOUNTER
Dr. Oliva was out of the office over the weekend. It looks like Toshia Taylor placed a new script this morning for amoxicillin to patients pharmacy.  I will done this.

## 2025-01-11 ENCOUNTER — OFFICE VISIT (OUTPATIENT)
Dept: URGENT CARE | Facility: URGENT CARE | Age: 55
End: 2025-01-11
Payer: COMMERCIAL

## 2025-01-11 VITALS
OXYGEN SATURATION: 98 % | HEART RATE: 79 BPM | TEMPERATURE: 97.7 F | RESPIRATION RATE: 20 BRPM | SYSTOLIC BLOOD PRESSURE: 156 MMHG | DIASTOLIC BLOOD PRESSURE: 92 MMHG

## 2025-01-11 DIAGNOSIS — J34.3 NASAL TURBINATE HYPERTROPHY: Primary | ICD-10-CM

## 2025-01-11 DIAGNOSIS — H92.02 ACUTE EAR PAIN, LEFT: ICD-10-CM

## 2025-01-11 PROCEDURE — 99214 OFFICE O/P EST MOD 30 MIN: CPT | Performed by: PHYSICIAN ASSISTANT

## 2025-01-11 RX ORDER — LACTOBACILLUS RHAMNOSUS GG 10B CELL
1 CAPSULE ORAL 2 TIMES DAILY
COMMUNITY

## 2025-01-11 RX ORDER — MEDROXYPROGESTERONE ACETATE 10 MG
10 TABLET ORAL DAILY
Qty: 5 TABLET | Refills: 0 | Status: SHIPPED | OUTPATIENT
Start: 2025-01-11 | End: 2025-01-16

## 2025-01-11 RX ORDER — PREDNISONE 10 MG/1
10 TABLET ORAL DAILY
Qty: 5 TABLET | Refills: 0 | Status: SHIPPED | OUTPATIENT
Start: 2025-01-11 | End: 2025-01-16

## 2025-01-11 RX ORDER — FLUTICASONE PROPIONATE 50 MCG
2 SPRAY, SUSPENSION (ML) NASAL DAILY
Qty: 18.2 ML | Refills: 2 | Status: SHIPPED | OUTPATIENT
Start: 2025-01-11

## 2025-01-11 NOTE — PROGRESS NOTES
Patient presents with:  Sinus Problem: Given amox 12/26 - improved but came back yesterday     (J34.3) Nasal turbinate hypertrophy  (primary encounter diagnosis)  Comment: secondary to likely underlying allergies  Plan: fluticasone (FLONASE) 50 MCG/ACT nasal spray        2 sprays each nostril at bedtime for MINIMUM of 2-3 weeks.  Use a saline nasal spray in the morning.     (H92.02) Acute ear pain, left  Comment: secondary to eustachian tube dysfunction from turbinate hypergtrophy  Plan:       Take every morning for 5 days.     Follow up with primary clinic should symptoms persist or worsen.        At th end of the encounter, I discussed results, diagnosis, medications. Discussed red flags for immediate return to clinic/ER, as well as indications for follow up if no improvement. Patient understood and agreed to plan. Patient was stable for discharge     If not improving or if condition worsens, follow up with your Primary Care Provider      Addendum: I contacted patient she had just received the prescription for the Provera, I advised her that this was written in error, and the prednisone was sent over instead.  She has not taken any of it.  I also spoke with the pharmacist about the issue.  He is currently working in filling the prednisone now and the patient will go back to the pharmacy.    SUBJECTIVE:   Lennie NÚÑEZ  is a 54 year old female who presents today with persistent nasal congestion and left ear pain.  Was treated with Amoxicillin 12/26/24.  Symptoms improved but never resolved.  No fevers.    No ear drainage.        Patient Active Problem List   Diagnosis    Menopausal syndrome (hot flashes)    Elevated blood pressure reading without diagnosis of hypertension       No past medical history on file.      Current Outpatient Medications   Medication Sig Dispense Refill    Multiple Vitamins-Iron (DAILY-DENNIS/IRON/BETA-CAROTENE) TABS TAKE 1 TABLET BY MOUTH DAILY. (Patient not taking: Reported on  10/19/2020) 30 tablet 7     Social History     Tobacco Use    Smoking status: Never Smoker    Smokeless tobacco: Never Used   Substance Use Topics    Alcohol use: Not on file     Family History   Problem Relation Age of Onset    Diabetes Mother     Diabetes Father          ROS:    10 point ROS of systems including Constitutional, Eyes, Respiratory, Cardiovascular, Gastroenterology, Genitourinary, Integumentary, Muscularskeletal, Psychiatric ,neurological were all negative except for pertinent positives noted in my HPI       OBJECTIVE:  BP (!) 156/92 (BP Location: Left arm, Patient Position: Sitting, Cuff Size: Adult Regular)   Pulse 79   Temp 97.7  F (36.5  C)   Resp 20   SpO2 98%   Physical Exam:  GENERAL APPEARANCE: healthy, alert and no distress  EYES: EOMI,  PERRL, conjunctiva clear  HENT: ear canals and TM's normal.  Nose and mouth without ulcers, erythema or lesions  HENT: nasal turbinates boggy with bluish hue and rhinorrhea clear  NECK: supple, nontender, no lymphadenopathy  RESP: lungs clear to auscultation - no rales, rhonchi or wheezes  CV: regular rates and rhythm, normal S1 S2, no murmur noted  NEURO: Normal strength and tone, sensory exam grossly normal,  normal speech and mentation  SKIN: no suspicious lesions or rashes

## 2025-01-11 NOTE — PATIENT INSTRUCTIONS
(J34.3) Nasal turbinate hypertrophy  (primary encounter diagnosis)  Comment: secondary to likely underlying allergies  Plan: fluticasone (FLONASE) 50 MCG/ACT nasal spray        2 sprays each nostril at bedtime for MINIMUM of 2-3 weeks.  Use a saline nasal spray in the morning.     (H92.02) Acute ear pain, left  Comment: secondary to eustachian tube dysfunction from turbinate hypergtrophy  Plan:       Take every morning for 5 days.   PREDNISONE 10mg take every morning for 5 days    Follow up with primary clinic should symptoms persist or worsen.

## 2025-01-19 ENCOUNTER — E-VISIT (OUTPATIENT)
Dept: INTERNAL MEDICINE | Facility: CLINIC | Age: 55
End: 2025-01-19
Payer: COMMERCIAL

## 2025-01-19 DIAGNOSIS — R69 DIAGNOSIS UNKNOWN: Primary | ICD-10-CM

## 2025-01-19 DIAGNOSIS — N95.1 MENOPAUSAL SYNDROME (HOT FLASHES): ICD-10-CM

## 2025-01-20 ENCOUNTER — OFFICE VISIT (OUTPATIENT)
Dept: INTERNAL MEDICINE | Facility: CLINIC | Age: 55
End: 2025-01-20
Attending: NURSE PRACTITIONER
Payer: COMMERCIAL

## 2025-01-20 VITALS
BODY MASS INDEX: 22.9 KG/M2 | TEMPERATURE: 97.9 F | DIASTOLIC BLOOD PRESSURE: 83 MMHG | HEART RATE: 83 BPM | OXYGEN SATURATION: 99 % | SYSTOLIC BLOOD PRESSURE: 152 MMHG | WEIGHT: 142.5 LBS | HEIGHT: 66 IN

## 2025-01-20 DIAGNOSIS — F06.4 ANXIETY DISORDER DUE TO MEDICAL CONDITION: ICD-10-CM

## 2025-01-20 DIAGNOSIS — J34.89 PAIN OF MAXILLARY SINUS: ICD-10-CM

## 2025-01-20 DIAGNOSIS — R09.81 CONGESTION OF PARANASAL SINUS: Primary | ICD-10-CM

## 2025-01-20 PROCEDURE — 99213 OFFICE O/P EST LOW 20 MIN: CPT | Performed by: PHYSICIAN ASSISTANT

## 2025-01-20 RX ORDER — PROGESTERONE 100 MG/1
100 CAPSULE ORAL DAILY
Qty: 90 CAPSULE | Refills: 1 | Status: SHIPPED | OUTPATIENT
Start: 2025-01-20

## 2025-01-20 RX ORDER — PROGESTERONE 100 MG/1
100 CAPSULE ORAL DAILY
Qty: 90 CAPSULE | Refills: 1 | Status: CANCELLED | OUTPATIENT
Start: 2025-01-20

## 2025-01-20 RX ORDER — ESTRADIOL 1 MG/1
1 TABLET ORAL DAILY
Qty: 90 TABLET | Refills: 1 | Status: CANCELLED | OUTPATIENT
Start: 2025-01-20

## 2025-01-20 RX ORDER — ESTRADIOL 1 MG/1
1 TABLET ORAL DAILY
Qty: 90 TABLET | Refills: 1 | Status: SHIPPED | OUTPATIENT
Start: 2025-01-20

## 2025-01-20 NOTE — PROGRESS NOTES
"  Assessment & Plan     Congestion of paranasal sinus    - CT Sinus w/o Contrast; Future    Pain of maxillary sinus    - CT Sinus w/o Contrast; Future    Anxiety due to medical condition     Patient with concerns about ongoing congestion and left ear symptoms  Reviewed patient instructions - Continue with Flonase, maybe weeks before improvement   CT ordered as above   Reassurance will notify of results of scan when available   If anxiety is disruptive for normal day to day activities etc then recommend to start with therapy   Discussed options.             Zaire Narayanan is a 54 year old, presenting for the following health issues:  Sinus Problem    History of Present Illness       Reason for visit:  Ongoing sinus irritation. feels as if something might be stuck in sinus cavity. causing some irritation to ear canal. Do I need referral to ENT specialist? would also like to discuss anxiety.    She eats 0-1 servings of fruits and vegetables daily.She consumes 0 sweetened beverage(s) daily.She exercises with enough effort to increase her heart rate 30 to 60 minutes per day.  She exercises with enough effort to increase her heart rate 7 days per week.   She is taking medications regularly.       Patient seen by  on 1/11- dx with turbinate hypertrophy/ allergy and ETD.   She was prescribed prednisone and Flonase to use.  She continues to have symptoms of pressure in the left side of the face/cheek and symptoms in the ear.   She has some anxiety in regards to this as well.     Reviewed chart and she was seen with outside UC 12/26/2024 and dx with sinusitis- treatment with amoxicillin then                   Objective    BP (!) 152/83   Pulse 83   Temp 97.9  F (36.6  C) (Temporal)   Ht 1.676 m (5' 6\")   Wt 64.6 kg (142 lb 8 oz)   SpO2 99%   BMI 23.00 kg/m    Body mass index is 23 kg/m .  Physical Exam   GENERAL: alert and no distress  HENT: normal cephalic/atraumatic, right ear: clear effusion, left ear: clear " effusion, nasal mucosa edematous pale boggy turbinate appreciated on the left side , rhinorrhea clear, oropharynx clear, and oral mucous membranes moist  MS: no gross musculoskeletal defects noted, no edema            Signed Electronically by: Jillian Kemp PA-C

## 2025-01-20 NOTE — TELEPHONE ENCOUNTER
Medication refill provided.  Insufficient information to evaluate sinus symptoms.  Recommend visit, either in person or virtual, for further evaluation.    Provider E-Visit time total (minutes): 3

## 2025-01-20 NOTE — PATIENT INSTRUCTIONS
Dear Lady,    We are sorry you are not feeling well. Based on the responses you provided, it is recommended that you be seen in-person in clinic so we can better evaluate your symptoms. Please schedule this visit in [employer]. You will have a Schedule Now button in [employer] to help with scheduling this appointment. Otherwise, you can call [phone] to schedule an appointment.     You will not be charged for this eVisit. Thank you for trusting us with your care.     JOHN Schroeder CNP    I am glad you are doing well. I have refilled your medication:  Orders Placed This Encounter   Medications     estradiol (ESTRACE) 1 MG tablet     Sig: Take 1 tablet (1 mg) by mouth daily.     Dispense:  90 tablet     Refill:  1     progesterone (PROMETRIUM) 100 MG capsule     Sig: Take 1 capsule (100 mg) by mouth daily. In the evening     Dispense:  90 capsule     Refill:  1        View your full visit summary for details by clicking on the link below. Your pharmacist will be able to address any questions you may have about the medication.      Thank you for choosing us for your care.

## 2025-02-06 ENCOUNTER — OFFICE VISIT (OUTPATIENT)
Dept: URGENT CARE | Facility: URGENT CARE | Age: 55
End: 2025-02-06
Payer: COMMERCIAL

## 2025-02-06 VITALS
BODY MASS INDEX: 22.5 KG/M2 | SYSTOLIC BLOOD PRESSURE: 157 MMHG | WEIGHT: 140 LBS | HEART RATE: 66 BPM | HEIGHT: 66 IN | OXYGEN SATURATION: 96 % | TEMPERATURE: 98.4 F | DIASTOLIC BLOOD PRESSURE: 101 MMHG | RESPIRATION RATE: 15 BRPM

## 2025-02-06 DIAGNOSIS — R14.0 BLOATING: Primary | ICD-10-CM

## 2025-02-07 NOTE — PROGRESS NOTES
Chief Complaint   Patient presents with    Abdominal Pain     X2 days of abdomen pain and bloating     Urgent Care     Pain starting to spread around to back        Assessment & Plan  Assessment  The symptoms may be due to indigestion or a muscle ache. Gallbladder issues are considered but less likely due to the normal examination findings. Blood clots are also considered but deemed unlikely given the absence of suggestive symptoms such as dyspnea, abnormal heart rate, leg swelling, or calf tenderness. Kidney stones are a potential consideration, although the typical severe pain associated with them is not present.  Considered pyelonephritis but she has no infectious symptoms and no dysuria.  Considered MI but she was able to jog normally this morning.  Patient does express anxiety about this problem and discussed that currently her vitals are reassuring and her exam seems normal with nonacute abdomen, no CVA tenderness and no other significant abnormality.  But supported her feelings of anxiety and if she needs definitive answer going to the ER is very reasonable but if she is decided not to and if she has any new or worsening symptoms she is to go immediately to the emergency room.     Plan  - Monitor symptoms and return to urgent care if they persist or new mild symptoms develop.  - Seek emergency room care if symptoms significantly worsen or if a definitive diagnosis is desired.    Appointments  - Return to urgent care tomorrow morning if symptoms worsen or new mild symptoms appear.     ICD-10-CM    1. Bloating  R14.0           SUBJECTIVE:  History of Present Illness-Lady NÚÑEZ , a 54-year-old female, reports experiencing abdominal pain and bloating for the past two days, starting on February 4, 2025. The pain occasionally radiates to her back and right side. She describes the abdominal discomfort more as a sensation of fullness rather than pain. The symptoms are intermittent and not associated with any  "specific triggers. She has tried taking Gas-X without relief, which has led to concerns about a more serious underlying issue. She denies constipation, diarrhea, pain with urination, frequent urination, nausea, vomiting, fevers, chills, shortness of breath, or changes in stool color. She is currently taking progesterone and estradiol. She jogged this morning and had no issues.  And reports no calf tenderness.     ROS: Pertinent ROS neg other than the symptoms noted above in the HPI.     OBJECTIVE:  BP (!) 157/101   Pulse 66   Temp 98.4  F (36.9  C) (Temporal)   Resp 15   Ht 1.676 m (5' 6\")   Wt 63.5 kg (140 lb)   SpO2 96%   BMI 22.60 kg/m     Physical Exam  - CARDIOVASCULAR: Heart rate normal.  - LUNGS: Lungs clear to auscultation.  - ABDOMEN: Non-tender abdomen, negative Varela's sign, normoactive bowel sounds.  - EXTREMITIES: No tenderness in calves, no edema in extremities.       DIAGNOSTICS       No results found for any visits on 02/06/25.     Austin Calvo PA-C    Consent was obtained from the patient to use an AI documentation tool in the creation of this note.  Any grammatical or spelling errors are non-intentional. Please contact the author of this note directly if you are in need of any clarification.     Current Outpatient Medications   Medication Sig Dispense Refill    calcium carbonate (OS-ANGEL 500 MG [place]. [place]) 1250 MG tablet Take 2 tablets by mouth 2 times daily      estradiol (ESTRACE) 1 MG tablet Take 1 tablet (1 mg) by mouth daily. 90 tablet 1    fluticasone (FLONASE) 50 MCG/ACT nasal spray Spray 2 sprays into both nostrils daily. 18.2 mL 2    progesterone (PROMETRIUM) 100 MG capsule Take 1 capsule (100 mg) by mouth daily. In the evening 90 capsule 1    Multiple Vitamin (MULTI-VITAMINS) TABS Take by mouth.       No current facility-administered medications for this visit.      Patient Active Problem List   Diagnosis    Menopausal syndrome (hot flashes)    Elevated blood pressure reading " without diagnosis of hypertension      No past medical history on file.  Past Surgical History:   Procedure Laterality Date    AS RAD RESEC TONSIL/PILLARS      HAND SURGERY Left      Family History   Problem Relation Age of Onset    Hyperlipidemia Mother     Osteoporosis Mother     Thyroid Disease Mother     Coronary Artery Disease Father     Cerebrovascular Disease Maternal Grandmother     Pancreatic Cancer Maternal Grandfather     Coronary Artery Disease Paternal Grandfather      Social History     Tobacco Use    Smoking status: Never    Smokeless tobacco: Never   Substance Use Topics    Alcohol use: Yes     Alcohol/week: 1.0 standard drink of alcohol     Types: 1 Cans of beer per week     Comment: one a week

## 2025-02-19 ENCOUNTER — E-VISIT (OUTPATIENT)
Dept: URGENT CARE | Facility: CLINIC | Age: 55
End: 2025-02-19
Payer: COMMERCIAL

## 2025-02-19 DIAGNOSIS — J01.10 ACUTE NON-RECURRENT FRONTAL SINUSITIS: Primary | ICD-10-CM

## 2025-02-19 NOTE — PATIENT INSTRUCTIONS
Acute Sinusitis: Care Instructions  Overview     Acute sinusitis is an inflammation of the mucous membranes inside the nose and sinuses. Sinuses are the hollow spaces in your skull around the eyes and nose. Acute sinusitis often follows a cold. Acute sinusitis causes thick, discolored mucus that drains from the nose or down the back of the throat. It also can cause pain and pressure in your head and face along with a stuffy or blocked nose.  In most cases, sinusitis gets better on its own in 1 to 2 weeks. But some mild symptoms may last for several weeks. Sometimes antibiotics are needed if there is a bacterial infection.  Follow-up care is a key part of your treatment and safety. Be sure to make and go to all appointments, and call your doctor if you are having problems. It's also a good idea to know your test results and keep a list of the medicines you take.  How can you care for yourself at home?  Use saline (saltwater) nasal washes. This can help keep your nasal passages open and wash out mucus and allergens.  You can buy saline nose washes at a grocery store or drugstore. Follow the instructions on the package.  You can make your own at home. Add 1 teaspoon of non-iodized salt and 1 teaspoon of baking soda to 2 cups of distilled or boiled and cooled water. Fill a squeeze bottle or a nasal cleansing pot (such as a neti pot) with the nasal wash. Then put the tip into your nostril, and lean over the sink. With your mouth open, gently squirt the liquid. Repeat on the other side.  Try a decongestant nasal spray like oxymetazoline (Afrin). Do not use it for more than 3 days in a row. Using it for more than 3 days can make your congestion worse.  If needed, take an over-the-counter pain medicine, such as acetaminophen (Tylenol), ibuprofen (Advil, Motrin), or naproxen (Aleve). Read and follow all instructions on the label.  If the doctor prescribed antibiotics, take them as directed. Do not stop taking them just  "because you feel better. You need to take the full course of antibiotics.  Be careful when taking over-the-counter cold or flu medicines and Tylenol at the same time. Many of these medicines have acetaminophen, which is Tylenol. Read the labels to make sure that you are not taking more than the recommended dose. Too much acetaminophen (Tylenol) can be harmful.  Try a steroid nasal spray. It may help with your symptoms.  Breathe warm, moist air. You can use a steamy shower, a hot bath, or a sink filled with hot water. Avoid cold, dry air. Using a humidifier in your home may help. Follow the directions for cleaning the machine.  When should you call for help?   Call your doctor now or seek immediate medical care if:    You have new or worse swelling, redness, or pain in your face or around one or both of your eyes.     You have double vision or a change in your vision.     You have a high fever.     You have a severe headache and a stiff neck.     You have mental changes, such as feeling confused or much less alert.   Watch closely for changes in your health, and be sure to contact your doctor if:    You are not getting better as expected.   Where can you learn more?  Go to https://www.[employer].net/patiented  Enter I933 in the search box to learn more about \"Acute Sinusitis: Care Instructions.\"  Current as of: September 27, 2023  Content Version: 14.3    2024 [employer].   Care instructions adapted under license by your healthcare professional. If you have questions about a medical condition or this instruction, always ask your healthcare professional. [employer] disclaims any warranty or liability for your use of this information.     The symptoms you describe suggest a viral cause, which is much more common than a bacterial cause. Antibiotics will treat bacterial infections, but have no effect on viral infections. If possible, especially if improving, start with symptom care for the first " 7-10 days, then consider seeking further treatment or taking an antibiotic. Bacterial infections generally are more severe, including symptoms such as pus, fever over 101degrees F, or rapidly worsening.  You may want to try a nasal lavage (also known as nasal irrigation). You can find over-the-counter products, such as Neti-Pot, at retail locations or make your own at home. Instructions for homemade nasal lavage and more information on the process are available online at http://www.aafp.org/afp/2009/1115/p1121.html.    Dear Lennie More    After reviewing your responses, I've been able to diagnose you with Acute non-recurrent frontal sinusitis.      Based on your responses and diagnosis, I have prescribed No orders of the defined types were placed in this encounter.   to treat your symptoms. I have sent this to your pharmacy.?     It is also important to stay well hydrated, get lots of rest and take over-the-counter decongestants,?tylenol?or ibuprofen if you?are able to?take those medications per your primary care provider to help relieve discomfort.?     It is important that you take?all of?your prescribed medication even if your symptoms are improving after a few doses.? Taking?all of?your medicine helps prevent the symptoms from returning.?     If your symptoms worsen, you develop severe headache, vomiting, high fever (>102), or are not improving in 7 days, please contact your primary care provider for an appointment or visit any of our convenient Walk-in Care or Urgent Care Centers to be seen which can be found on our website?here.?     Thanks again for choosing?us?as your health care partner,?   ?  MILIND BELTRAN, JOHN CNP?

## 2025-02-22 ENCOUNTER — MYC MEDICAL ADVICE (OUTPATIENT)
Dept: INTERNAL MEDICINE | Facility: CLINIC | Age: 55
End: 2025-02-22
Payer: COMMERCIAL

## 2025-02-24 NOTE — TELEPHONE ENCOUNTER
Please scheduled for in-person or virtual visit with me this week. OK to use same day spot. Thanks

## 2025-02-25 ASSESSMENT — ANXIETY QUESTIONNAIRES
5. BEING SO RESTLESS THAT IT IS HARD TO SIT STILL: NOT AT ALL
GAD7 TOTAL SCORE: 5
7. FEELING AFRAID AS IF SOMETHING AWFUL MIGHT HAPPEN: SEVERAL DAYS
2. NOT BEING ABLE TO STOP OR CONTROL WORRYING: SEVERAL DAYS
4. TROUBLE RELAXING: SEVERAL DAYS
8. IF YOU CHECKED OFF ANY PROBLEMS, HOW DIFFICULT HAVE THESE MADE IT FOR YOU TO DO YOUR WORK, TAKE CARE OF THINGS AT HOME, OR GET ALONG WITH OTHER PEOPLE?: SOMEWHAT DIFFICULT
3. WORRYING TOO MUCH ABOUT DIFFERENT THINGS: SEVERAL DAYS
IF YOU CHECKED OFF ANY PROBLEMS ON THIS QUESTIONNAIRE, HOW DIFFICULT HAVE THESE PROBLEMS MADE IT FOR YOU TO DO YOUR WORK, TAKE CARE OF THINGS AT HOME, OR GET ALONG WITH OTHER PEOPLE: SOMEWHAT DIFFICULT
GAD7 TOTAL SCORE: 5
6. BECOMING EASILY ANNOYED OR IRRITABLE: NOT AT ALL
7. FEELING AFRAID AS IF SOMETHING AWFUL MIGHT HAPPEN: SEVERAL DAYS
1. FEELING NERVOUS, ANXIOUS, OR ON EDGE: SEVERAL DAYS
GAD7 TOTAL SCORE: 5

## 2025-02-26 ENCOUNTER — VIRTUAL VISIT (OUTPATIENT)
Dept: INTERNAL MEDICINE | Facility: CLINIC | Age: 55
End: 2025-02-26
Payer: COMMERCIAL

## 2025-02-26 DIAGNOSIS — F41.9 ANXIETY: Primary | ICD-10-CM

## 2025-02-26 PROCEDURE — 98005 SYNCH AUDIO-VIDEO EST LOW 20: CPT | Performed by: NURSE PRACTITIONER

## 2025-02-26 RX ORDER — HYDROXYZINE HYDROCHLORIDE 10 MG/1
10-20 TABLET, FILM COATED ORAL 3 TIMES DAILY PRN
Qty: 60 TABLET | Refills: 2 | Status: SHIPPED | OUTPATIENT
Start: 2025-02-26

## 2025-02-26 ASSESSMENT — PATIENT HEALTH QUESTIONNAIRE - PHQ9
10. IF YOU CHECKED OFF ANY PROBLEMS, HOW DIFFICULT HAVE THESE PROBLEMS MADE IT FOR YOU TO DO YOUR WORK, TAKE CARE OF THINGS AT HOME, OR GET ALONG WITH OTHER PEOPLE: NOT DIFFICULT AT ALL
SUM OF ALL RESPONSES TO PHQ QUESTIONS 1-9: 1
SUM OF ALL RESPONSES TO PHQ QUESTIONS 1-9: 1

## 2025-02-26 NOTE — PROGRESS NOTES
"Lady is a 54 year old who is being evaluated via a billable video visit.    How would you like to obtain your AVS? MyChart  If the video visit is dropped, the invitation should be resent by: Text to cell phone: [phone]  Will anyone else be joining your video visit? No    Assessment & Plan     (F41.9) Anxiety  (primary encounter diagnosis)  Comment:   Start hydroxyzine 10-20 mg TID PRN. Reviewed course of action, dosing, and potential side effects. Questions answered.  Discussed alternatives if hydroxyzine is ineffective or not tolerated, including propranolol and gabapentin.  Discussed SSRI/SNRI medication, which she declines today.  Counseling: Referred today. Discussed connecting with a community partner of [employer] appointments are too far out.  Provided behavior activation and breathing/progressive muscle relaxation handouts (see AVS). These were reviewed with patient.  Follow up in in 1 month  Plan: Adult Mental Health  Referral,         hydrOXYzine HCl (ATARAX) 10 MG tablet     See Patient Instructions        Subjective   Lady is a 54 year old, presenting for the following health issues:  Depression and Anxiety    History of Present Illness       Mental Health Follow-up:  Patient presents to follow-up on Anxiety.    Patient's anxiety since last visit has been:  Medium  The patient is not having other symptoms associated with anxiety.  Any significant life events: No  Patient is feeling anxious or having panic attacks.  Patient has no concerns about alcohol or drug use.   She is taking medications regularly.     Got a sinus infection around [place] and was prescribed antibiotics in [place], which didn't resolve symptoms. Received a second antibiotic prescription and was also recommended fluticasone for ear pressure at a subsequent  visit. She used fluticasone for a couple of weeks. When she stopped the fluticasone, \"that's when the anxiety really started settling in.\" She stopped fluticasone " 2 weeks ago, but her anxiety has persisted.  She denies history of anxiety, depression, or other mood disorders. She has never taken medication for mood or been in counseling.    She cannot think of anything else that might be triggering anxiety, although she feels generally anxious related to the recent political events and executive orders. She has a transgender family member and fears for their safety.  She is feeling some stress related to her mother's upcoming hip replacement surgery, but reports anxiety was already present when she learned of her mother's upcoming procedure.        2/26/2025    11:27 AM   PHQ-9 SCORE   PHQ-9 Total Score MyChart 1 (Minimal depression)   PHQ-9 Total Score 1        Patient-reported         2/25/2025     2:38 PM   RICKY-7 SCORE   Total Score 5 (mild anxiety)   Total Score 5        Patient-reported       Review of Systems  Constitutional, HEENT, cardiovascular, pulmonary, GI, , musculoskeletal, neuro, skin, endocrine and psych systems are negative, except as otherwise noted.      Objective    Vitals - Patient Reported  Weight (Patient Reported): 63.5 kg (140 lb)  Vitals:  No vitals were obtained today due to virtual visit.    Physical Exam   GENERAL: alert and no distress  EYES: Eyes grossly normal to inspection.  No discharge or erythema, or obvious scleral/conjunctival abnormalities.  RESP: No audible wheeze, cough, or visible cyanosis.    SKIN: Visible skin clear. No significant rash, abnormal pigmentation or lesions.  NEURO: Cranial nerves grossly intact.  Mentation and speech appropriate for age.  PSYCH: mentation appears normal and anxious. Responsive to emotional support.        Video-Visit Details    Type of service:  Video Visit   Originating Location (pt. Location): Home  Distant Location (provider location):  On-site  Platform used for Video Visit: Dottie  Signed Electronically by: JOHN Schroeder CNP

## 2025-03-03 ENCOUNTER — VIRTUAL VISIT (OUTPATIENT)
Dept: PSYCHOLOGY | Facility: CLINIC | Age: 55
End: 2025-03-03
Attending: NURSE PRACTITIONER

## 2025-03-03 DIAGNOSIS — F43.22 ADJUSTMENT DISORDER WITH ANXIETY: Primary | ICD-10-CM

## 2025-03-03 PROCEDURE — 90791 PSYCH DIAGNOSTIC EVALUATION: CPT | Mod: 95 | Performed by: PSYCHOLOGIST

## 2025-03-03 ASSESSMENT — COLUMBIA-SUICIDE SEVERITY RATING SCALE - C-SSRS
2. HAVE YOU ACTUALLY HAD ANY THOUGHTS OF KILLING YOURSELF?: NO
TOTAL  NUMBER OF INTERRUPTED ATTEMPTS LIFETIME: NO
ATTEMPT LIFETIME: NO
TOTAL  NUMBER OF ABORTED OR SELF INTERRUPTED ATTEMPTS LIFETIME: NO
1. HAVE YOU WISHED YOU WERE DEAD OR WISHED YOU COULD GO TO SLEEP AND NOT WAKE UP?: NO
6. HAVE YOU EVER DONE ANYTHING, STARTED TO DO ANYTHING, OR PREPARED TO DO ANYTHING TO END YOUR LIFE?: NO

## 2025-03-03 NOTE — PROGRESS NOTES
"    [facility]         PATIENT'S NAME: Lennie NÚÑEZ   PREFERRED NAME: Lady  PRONOUNS:     She/ her  MRN: [id number]  : 1970  ADDRESS: [address] [place] [id number]  ACCT. NUMBER:  [id number]  DATE OF SERVICE: 3/03/25  START TIME: 12:57 PM  END TIME: 02:01 PM  PREFERRED PHONE: [phone]  May we leave a program related message: Yes    EMERGENCY CONTACT: was obtained Abril Fontana, [phone].    SERVICE MODALITY:  Video Visit:      Provider verified identity through the following two step process.  Patient provided:  Patient  and Patient address    Telemedicine Visit: The patient's condition can be safely assessed and treated via synchronous audio and visual telemedicine encounter.      Reason for Telemedicine Visit: Patient has requested telehealth visit    Originating Site (Patient Location): Patient's home    Distant Site (Provider Location): Provider Remote Setting- Home Office    Consent:  The patient/guardian has verbally consented to: the potential risks and benefits of telemedicine (video visit) versus in person care; bill my insurance or make self-payment for services provided; and responsibility for payment of non-covered services.     Patient would like the video invitation sent by:  My Chart    Mode of Communication:  Video Conference via Amwell    Distant Location (Provider):  Off-site    As the provider I attest to compliance with applicable laws and regulations related to telemedicine.    UNIVERSAL ADULT Mental Health DIAGNOSTIC ASSESSMENT    Identifying Information:  Patient is a 54 year old,  individual.  Patient was referred for an assessment by primary care provider.  Patient attended the session alone.    Chief Complaint:   The reason for seeking services at this time is: \"anxiety\".  The problem(s) began 23.  Patient has attempted to resolve these concerns in the past through \"I teach Willy Chi, and Qigong and deep breathing.\" Anxiety revolves " "around physical health, financial health, cats health, and moms health.\"  \"I've lost sleep for one night\" in the past over stressors \"but I haven't experienced anything like this.\" Patient reports losing 2 cats last year.    Social/Family History:  Patient reported they grew up in [place], [place].  They were raised by biological parents.  Parents were always together.  Patient reported that their childhood was \"good\" \"It was [place] town, I had friends, activities, excelled in school.\"  Patient described their current relationships with family of origin as \"Good, I talk with mom once a week. She still lives in [place]. I don't talk with my sisters that often, but we get along.\" Reports having 1 sister who lives in [place].    The patient describes their cultural background as .  Cultural influences and impact on patient's life structure, values, norms, and healthcare: grew up in a small [facility] town where my parents owned a small business.  Contextual influences on patient's health include: Family Factors include having an aging mother who lives alone and Economic Factors Uncertainty after penitentiary.  These factors will be addressed in the Preliminary Treatment plan. Patient identified their preferred language to be English. Patient reported they does not need the assistance of an  or other support involved in therapy.     Patient reported had no significant delays in developmental tasks.   Patient's highest education level was graduate school. She has a masters in English.  Patient identified the following learning problems: none reported.  Modifications will not be used to assist communication in therapy. Patient reports they are  able to understand written materials.    Patient reported the following relationship history. \"I always had a medium size group of friends.\"  Patient's current relationship status is  for 25 years. They were  for 4 years. She reports attending " "couples therapy. She states, \"He didn't follow through on what I asked of him.\" She reports dating a little since then and \"It hasn't been a priority\" \"I'm self-employed\" \" I really like being my own boss.\" Patient identified their sexual orientation as heterosexual.  Patient reported having no children. Patient identified pets; friends as part of their support system.  Patient identified the quality of these relationships as good.      Patient's current living/housing situation involves staying in own home/apartment.  Reports this is a safe neighborhood. The immediate members of family include Abril , 83,mother and sister. They report that housing is stable.    Patient is currently employed fulltime.  Patient reports their finances are obtained through employment as an . Patient reports going to therapy previously after job losses. Patient does identify finances as a current stressor.  Reports \"I worry about the future. I have a lid on it now, but who knows.\"    Patient reported that they have not been involved with the legal system.  Patient does not report being under probation/ parole/ jurisdiction.     Patient's Strengths and Limitations:  Patient identified the following strengths or resources that will help them succeed in treatment: \"Sanity posse\" including a number of friends, and mom. exercise routine, friends / good social support, intelligence, motivation, sense of humor, and work ethic. Things that may interfere with the patient's success in treatment include: \"I like for things to be orderly and correct\" \"I'm a control freak, stubborn.\" \"I go around and around on topics, even if they are not good for me.\"    Assessments:  The following assessments were completed by patient for this visit:  PHQ9:       2/26/2025    11:27 AM 3/3/2025     7:18 AM   PHQ-9 SCORE   PHQ-9 Total Score MyChart 1 (Minimal depression) 1 (Minimal depression)   PHQ-9 Total Score 1  1        Patient-reported     GAD7: "       2/25/2025     2:38 PM   RICKY-7 SCORE   Total Score 5 (mild anxiety)   Total Score 5        Patient-reported     CAGE-AID:       3/3/2025     7:30 AM   CAGE-AID Total Score   Total Score 0    Total Score MyChart 0 (A total score of 2 or greater is considered clinically significant)       Patient-reported     PROMIS 10-Global Health (all questions and answers displayed):       3/3/2025     7:29 AM   PROMIS 10   In general, would you say your health is: Very good   In general, would you say your quality of life is: Very good   In general, how would you rate your physical health? Very good   In general, how would you rate your mental health, including your mood and your ability to think? Good   In general, how would you rate your satisfaction with your social activities and relationships? Good   In general, please rate how well you carry out your usual social activities and roles Very good   To what extent are you able to carry out your everyday physical activities such as walking, climbing stairs, carrying groceries, or moving a chair? Completely   In the past 7 days, how often have you been bothered by emotional problems such as feeling anxious, depressed, or irritable? Often   In the past 7 days, how would you rate your fatigue on average? Mild   In the past 7 days, how would you rate your pain on average, where 0 means no pain, and 10 means worst imaginable pain? 1   In general, would you say your health is: 4   In general, would you say your quality of life is: 4   In general, how would you rate your physical health? 4   In general, how would you rate your mental health, including your mood and your ability to think? 3   In general, how would you rate your satisfaction with your social activities and relationships? 3   In general, please rate how well you carry out your usual social activities and roles. (This includes activities at home, at work and in your community, and responsibilities as a parent, child,  "spouse, employee, friend, etc.) 4   To what extent are you able to carry out your everyday physical activities such as walking, climbing stairs, carrying groceries, or moving a chair? 5   In the past 7 days, how often have you been bothered by emotional problems such as feeling anxious, depressed, or irritable? 4   In the past 7 days, how would you rate your fatigue on average? 2   In the past 7 days, how would you rate your pain on average, where 0 means no pain, and 10 means worst imaginable pain? 1   Global Mental Health Score 12    Global Physical Health Score 17    PROMIS TOTAL - SUBSCORES 29        Patient-reported     [place] Suicide Severity Rating Scale (Lifetime/Recent)      3/3/2025    12:51 PM   [place] Suicide Severity Rating (Lifetime/Recent)   1. Wish to be Dead (Lifetime) N   2. Non-Specific Active Suicidal Thoughts (Lifetime) N   Actual Attempt (Lifetime) N   Has subject engaged in non-suicidal self-injurious behavior? (Lifetime) N   Interrupted Attempts (Lifetime) N   Aborted or Self-Interrupted Attempt (Lifetime) N   Preparatory Acts or Behavior (Lifetime) N   Calculated C-SSRS Risk Score (Lifetime/Recent) No Risk Indicated       Personal and Family Medical History:  Patient does not report a family history of mental health concerns.  Patient reports family history includes Cerebrovascular Disease in her maternal grandmother; Coronary Artery Disease in her father and paternal grandfather; Hyperlipidemia in her mother; Osteoporosis in her mother; Pancreatic Cancer in her maternal grandfather; Thyroid Disease in her mother.    Patient does report Mental Health Diagnosis and/or Treatment.  Reports having individual therapy and couples therapy that \"was not helpful.\" \"I got laid off from a couple jobs\" and saw a . Patient reported the following previous diagnoses which include(s): none reported.   Psychiatric Hospitalizations: None.  Patient denies a history of civil commitment.  Patient " "is not receiving other mental health services.     Patient has had a physical exam to rule out medical causes for current symptoms.  Date of last physical exam was greater than a year ago and has scheduled an exam with PCP. The patient has a [facility] Provider, who is named Mandie Syed.  Patient reports the following current medical concerns:    sinus condition.  Patient denies any issues with pain. There are significant appetite / nutritional concerns / weight changes including reduced appetite while experiencing anxiety/. Patient does not report a history of head injury / trauma / cognitive impairment.      Patient reports current meds as:   Current Outpatient Medications   Medication Sig Dispense Refill    calcium carbonate (OS-ANGEL 500 MG [place]. [place]) 1250 MG tablet Take 2 tablets by mouth 2 times daily      estradiol (ESTRACE) 1 MG tablet Take 1 tablet (1 mg) by mouth daily. 90 tablet 1    fluticasone (FLONASE) 50 MCG/ACT nasal spray Spray 2 sprays into both nostrils daily. (Patient not taking: Reported on 2/26/2025) 18.2 mL 2    hydrOXYzine HCl (ATARAX) 10 MG tablet Take 1-2 tablets (10-20 mg) by mouth 3 times daily as needed for anxiety. 60 tablet 2    Multiple Vitamin (MULTI-VITAMINS) TABS Take by mouth.      progesterone (PROMETRIUM) 100 MG capsule Take 1 capsule (100 mg) by mouth daily. In the evening 90 capsule 1     No current facility-administered medications for this visit.       Medication Adherence:  Patient reports taking psychiatric medications as prescribed. Reports hydroxyzine has been helpful. Psychiatrist friend recommend CBD gummies. Reports taking bites at a time of a 15 mg \"It's alright.\"    Patient Allergies:  No Known Allergies    Medical History:  No past medical history on file.      Current Mental Status Exam:   Appearance:  Appropriate    Eye Contact:  Fair   Psychomotor:  Normal       Gait / station:  not observed  Attitude / Demeanor: Attentive  Speech      Rate " / Production: Normal/ Responsive      Volume:  Normal  volume      Language:  intact  Mood:   Anxious   Affect:   Constricted    Thought Content: Clear   Thought Process: Coherent  Goal Directed       Associations: No loosening of associations  Insight:   Fair   Judgment:  Intact   Orientation:  All  Attention/concentration: Fair    Substance Use:   Patient did report a family history of substance use concerns; see medical history section for details. Has one cousin who is in recovery for alcohol abuse and another cousins child who has had substance abuse issues.  Patient has not received chemical dependency treatment in the past.  Patient has not ever been to detox.      Patient is not currently receiving any chemical dependency treatment.           Substance History of use Age of first use Date of last use     Pattern and duration of use (include amounts and frequency)   Alcohol never used       1 beer a week currently.   Cannabis   never used     CBD 15 mg consumed throughout the day.     Amphetamines   never used     NA   Cocaine/crack    never used       NA   Hallucinogens never used         NA   Inhalants never used         NA   Heroin never used         NA   Other Opiates never used     NA   Benzodiazepine   never used     NA   Barbiturates never used     NA   Over the counter meds never used     NA   Caffeine currently use 11   Has tea and one pop in the afternoon. Reports has reduced this since noticing increased anxiety.   Nicotine  never used     NA   Other substances not listed above:  Identify:  never used     NA     Patient reported the following problems as a result of their substance use: no problems, not applicable.      Substance Use: No symptoms    Based on the CAGE score of 0 and clinical interview there  are not indications of drug or alcohol abuse.    Significant Losses / Trauma / Abuse/ Neglect Issues:   Patient did not serve in the .  There are indications or report of significant  loss, trauma, abuse or neglect issues related to: Father  in , job loss, divorce, and lost 2 cats last year. Patient has not been a victim of exploitation. Concerns for possible neglect are not present.     Safety Assessment:   Patient denies current or past homicidal ideation and behaviors.  Patient denies current or past suicidal ideation and behaviors.  Patient denies current or past self-injurious behaviors.  Patient denied risk behaviors associated with substance use.  Patient denies any high risk behaviors associated with mental health symptoms.  Patient denied current or past personal safety concerns.    Patient denies past of current/recent assaultive behaviors.    Patient denied a history of sexual assault behaviors.     Patient reports there are not   firearms in the house.    Patient reports the following protective factors:  forward or future oriented thinking; dedication to family or friends; safe and stable environment; regular sleep; effectively controls impulses; regular physical activity; sense of belonging; purpose; secure attachment; help seeking behaviors when distressed; abstinence from substances; adherence with prescribed medication; daily obligations; structured day; effective problem solving skills; commitment to well being; positive social skills; healthy fear of risky behaviors or pain; financial stability; strong sense of self worth or esteem; sense of personal control or determination; access to a variety of clinical interventions and pets    Risk Plan:  See Recommendations for Safety and Risk Management Plan    Review of Symptoms per patient report:   Depression: Hx of depressive symptoms but no functional impact.  No symptoms currently.  Charisse:  No Symptoms  Psychosis: No Symptoms  Anxiety: Excessive worry, Nervousness, and Physical complaints, such as headaches, stomachaches, muscle tension  Panic:  Sense of impending doom  Post Traumatic Stress Disorder:  No Symptoms   Eating  Disorder: No Symptoms  ADD / ADHD:  No symptoms  Conduct Disorder: No symptoms  Autism Spectrum Disorder: No symptoms  Obsessive Compulsive Disorder: No Symptoms  Personality Disorders:   No symptoms    Patient reports the following compulsive behaviors and treatment history: None.      Diagnostic Criteria:   Adjustment Disorder  A. The development of emotional or behavioral symptoms in response to an identifiable stressor(s) occurring within 3 months of the onset of the stressor(s)  B. These symptoms or behaviors are clinically significant, as evidenced by one or both of the following:       - Significant impairment in social, occupational, or other important areas of functioning  C. The stress-related disturbance does not meet criteria for another disorder & is not not an exacerbation of another mental disorder  D. The symptoms do not represent normal bereavement  E. Once the stressor or its consequences have terminated, the symptoms do not persist for more than an additional 6 months       * Adjustment Disorder with Anxiety: The predominant manfestations are symptoms such as nervousness, worry, or jitteriness, or, in children separation anxiety from major attachment figures    Functional Status:  Patient reports the following functional impairments:   Disruption to normal engagement in pleasurable activities, Willy Chi and regular eating.   Nonprogrammatic care:  Patient is requesting basic services to address current mental health concerns.    Clinical Summary:  1. Psychosocial Factors:  Medical complexities, Grief and loss and impact to Willy Chi group practice.  Cultural and Contextual Factors: Aging mother who live alone, the passing of two cats last year, political and economic climate, and self-employed.  2. Principal DSM5 Diagnoses  (Sustained by DSM5 Criteria Listed Above):   Adjustment Disorders  309.24 (F43.22) With anxiety.  3. Other Diagnoses that is relevant to services:   NA.  4. Provisional Diagnosis:   R/O OCD related disorders evidenced by rigidity and control pattern of behaviors.  5. Prognosis: Return to Baseline Functioning.  6. Likely consequences of symptoms if not treated: Symptoms may worsen.  7. Patient strengths include:  caring, educated, empathetic, intelligent, motivated, and wants to learn .     Recommendations:     1. Plan for Safety and Risk Management:   Safety and Risk: Recommended that patient call 911 or go to the local ED should there be a change in any of these risk factors        Report to child / adult protection services was NA.     2. Patient's identified mental health concerns with a cultural influence will be addressed by individual therapy.    3. Initial Treatment will focus on:    Anxiety - Cognitive strategies, reducing physical manifestations of anxiety.     4. Resources/Service Plan:    services are not indicated.   Modifications to assist communication are not indicated.   Additional disability accommodations are not indicated.      5. Collaboration:   Collaboration / coordination of treatment will be initiated with the following  support professionals:  NA .      6.  Referrals:   The following referral(s) will be initiated:  NA .       A Release of Information has been obtained for the following:  NA .     Clinical Substantiation/medical necessity for the above recommendations:  The anxiety appears to be clinically significant because it has been affecting the patients functioning and engagement in important areas of life including daily activities, work and relationships. Presenting symptoms have posed barriers to engaging in coping skills needed to address presenting symptoms and overall mood and functioning. It is medically necessary for the client to engage in outpatient therapy on a weekly basis in order to build coping skills, sources of resiliency, psychoeducation regarding presenting symptoms, and resources needed to avoid a potentially higher level of care.      7. MOISE:    MOISE:  Discussed the general effects of drugs and alcohol on health and well-being. Recommendations:  Continue to reduce caffeine. Let health care team know if there is a significant increase in substance use.     8. Records:   These were reviewed at time of assessment.   Information in this assessment was obtained from the medical record and  provided by patient who is a good historian.  Patient will have open access to their mental health medical record.    9.   Interactive Complexity: No    10. Safety Plan: No Safety plan indicated    Provider Name/ Credentials:  Suze Goff MA [facility]  March 3, 2025

## 2025-03-04 PROBLEM — F41.9 ANXIETY: Status: ACTIVE | Noted: 2025-02-01

## 2025-03-04 SDOH — HEALTH STABILITY: PHYSICAL HEALTH: ON AVERAGE, HOW MANY DAYS PER WEEK DO YOU ENGAGE IN MODERATE TO STRENUOUS EXERCISE (LIKE A BRISK WALK)?: 6 DAYS

## 2025-03-04 SDOH — HEALTH STABILITY: PHYSICAL HEALTH: ON AVERAGE, HOW MANY MINUTES DO YOU ENGAGE IN EXERCISE AT THIS LEVEL?: 30 MIN

## 2025-03-04 ASSESSMENT — SOCIAL DETERMINANTS OF HEALTH (SDOH): HOW OFTEN DO YOU GET TOGETHER WITH FRIENDS OR RELATIVES?: THREE TIMES A WEEK

## 2025-03-13 ENCOUNTER — VIRTUAL VISIT (OUTPATIENT)
Dept: PSYCHOLOGY | Facility: CLINIC | Age: 55
End: 2025-03-13
Payer: COMMERCIAL

## 2025-03-13 DIAGNOSIS — F43.22 ADJUSTMENT DISORDER WITH ANXIETY: Primary | ICD-10-CM

## 2025-03-13 PROCEDURE — 90834 PSYTX W PT 45 MINUTES: CPT | Mod: 95 | Performed by: PSYCHOLOGIST

## 2025-03-13 NOTE — PROGRESS NOTES
[facility]                                     Progress Note    Patient Name: Lennie More  Date: 3/13/2025         Service Type: Individual      Session Start Time: 03:01  Session End Time: 03:53     Session Length: 52 minutes    Session #: 1    Attendees: Client attended alone    Service Modality:  Video Visit:      Provider verified identity through the following two step process.  Patient provided:  Patient is known previously to provider    Telemedicine Visit: The patient's condition can be safely assessed and treated via synchronous audio and visual telemedicine encounter.      Reason for Telemedicine Visit: Patient has requested telehealth visit    Originating Site (Patient Location): Patient's home    Distant Site (Provider Location): Provider Remote Setting- Home Office    Consent:  The patient/guardian has verbally consented to: the potential risks and benefits of telemedicine (video visit) versus in person care; bill my insurance or make self-payment for services provided; and responsibility for payment of non-covered services.     Patient would like the video invitation sent by:  My Chart    Mode of Communication:  Video Conference via Amwell    Distant Location (Provider):  Off-site    As the provider I attest to compliance with applicable laws and regulations related to telemedicine.    DATA  Interactive Complexity: No  Crisis: No        Progress Since Last Session (Related to Symptoms / Goals / Homework):   Symptoms: Improving      Homework: Completed in session      Episode of Care Goals: Satisfactory progress - PREPARATION (Decided to change - considering how); Intervened by negotiating a change plan and determining options / strategies for behavior change, identifying triggers, exploring social supports, and working towards setting a date to begin behavior change     Current / Ongoing Stressors and Concerns:     Patient and provider began reviewing and identifying areas  "of therapy treatment that would be helpful to her. Patient would like to work on skills that can help her  recognize I have thoughts and feelings related to anxiety and not have them hurt me.\"  She and provider reviewed the ACT hexaflex model, particularly, values and present moment awareness.      Treatment Objective(s) Addressed in This Session:   identify 3 initial signs or symptoms of anxiety     Intervention:   ACT hexaflex model: Present moment and values    Assessments completed prior to visit:  The following assessments were completed by patient for this visit:  PHQ9:       2/26/2025    11:27 AM 3/3/2025     7:18 AM   PHQ-9 SCORE   PHQ-9 Total Score MyChart 1 (Minimal depression) 1 (Minimal depression)   PHQ-9 Total Score 1  1        Patient-reported     GAD7:       2/25/2025     2:38 PM   RICKY-7 SCORE   Total Score 5 (mild anxiety)   Total Score 5        Patient-reported     CAGE-AID:       3/3/2025     7:30 AM   CAGE-AID Total Score   Total Score 0    Total Score MyChart 0 (A total score of 2 or greater is considered clinically significant)       Patient-reported     PROMIS 10-Global Health (all questions and answers displayed):       3/3/2025     7:29 AM   PROMIS 10   In general, would you say your health is: Very good   In general, would you say your quality of life is: Very good   In general, how would you rate your physical health? Very good   In general, how would you rate your mental health, including your mood and your ability to think? Good   In general, how would you rate your satisfaction with your social activities and relationships? Good   In general, please rate how well you carry out your usual social activities and roles Very good   To what extent are you able to carry out your everyday physical activities such as walking, climbing stairs, carrying groceries, or moving a chair? Completely   In the past 7 days, how often have you been bothered by emotional problems such as feeling anxious, " depressed, or irritable? Often   In the past 7 days, how would you rate your fatigue on average? Mild   In the past 7 days, how would you rate your pain on average, where 0 means no pain, and 10 means worst imaginable pain? 1   In general, would you say your health is: 4   In general, would you say your quality of life is: 4   In general, how would you rate your physical health? 4   In general, how would you rate your mental health, including your mood and your ability to think? 3   In general, how would you rate your satisfaction with your social activities and relationships? 3   In general, please rate how well you carry out your usual social activities and roles. (This includes activities at home, at work and in your community, and responsibilities as a parent, child, spouse, employee, friend, etc.) 4   To what extent are you able to carry out your everyday physical activities such as walking, climbing stairs, carrying groceries, or moving a chair? 5   In the past 7 days, how often have you been bothered by emotional problems such as feeling anxious, depressed, or irritable? 4   In the past 7 days, how would you rate your fatigue on average? 2   In the past 7 days, how would you rate your pain on average, where 0 means no pain, and 10 means worst imaginable pain? 1   Global Mental Health Score 12    Global Physical Health Score 17    PROMIS TOTAL - SUBSCORES 29        Patient-reported     PROMIS Parent Proxy Scale V1.0 Global Health 7+2: No questionnaires on file.  [place] Suicide Severity Rating Scale (Lifetime/Recent)      3/3/2025    12:51 PM   [place] Suicide Severity Rating (Lifetime/Recent)   1. Wish to be Dead (Lifetime) N   2. Non-Specific Active Suicidal Thoughts (Lifetime) N   Actual Attempt (Lifetime) N   Has subject engaged in non-suicidal self-injurious behavior? (Lifetime) N   Interrupted Attempts (Lifetime) N   Aborted or Self-Interrupted Attempt (Lifetime) N   Preparatory Acts or Behavior  (Lifetime) N   Calculated C-SSRS Risk Score (Lifetime/Recent) No Risk Indicated         ASSESSMENT: Current Emotional / Mental Status (status of significant symptoms):   Risk status (Self / Other harm or suicidal ideation)   Patient denies current fears or concerns for personal safety.   Patient denies current or recent suicidal ideation or behaviors.   Patient denies current or recent homicidal ideation or behaviors.   Patient denies current or recent self injurious behavior or ideation.   Patient denies other safety concerns.   Patient reports there has been no change in risk factors since their last session.     Patient reports there has been no change in protective factors since their last session.     Recommended that patient call 911 or go to the local ED should there be a change in any of these risk factors     Appearance:   Appropriate    Eye Contact:   Good    Psychomotor Behavior: Normal    Attitude:   Interested Attentive   Orientation:   All   Speech    Rate / Production: Normal     Volume:  Normal    Mood:    Anxious    Affect:    Constricted  Tearful   Thought Content:  Clear    Thought Form:  Coherent  Goal Directed  Logical    Insight:    Fair      Medication Review:   No changes to current psychiatric medication(s)     Medication Compliance:   Yes     Changes in Health Issues:   None reported     Chemical Use Review:   Substance Use: Chemical use reviewed, no active concerns identified      Tobacco Use: No current tobacco use.      Diagnosis:  1. Adjustment disorder with anxiety        Collateral Reports Completed:   Not Applicable    PLAN: (Patient Tasks / Therapist Tasks / Other)  Practice present moment awareness with desirable and undesirable emotions.  Connect with mom in the coming week.      Suze Goff MA [facility]    ______________________________________________________________________    Individual Treatment Plan    Patient's Name: Lennie NÚÑEZ   YOB: 1970    Date  of Creation: 3/13/2025  Date Treatment Plan Last Reviewed/Revised: 3/13/2025    DSM5 Diagnoses: Adjustment Disorders  309.24 (F43.22) With anxiety  Psychosocial / Contextual Factors: Psychosocial Factors:  Medical complexities, Grief and loss and impact to [facility].  Cultural and Contextual Factors: Aging mother who live alone, the passing of two cats last year, political and economic climate, and self-employed.   PROMIS (reviewed every 90 days):       3/3/2025     7:29 AM   PROMIS 10   In general, would you say your health is: Very good   In general, would you say your quality of life is: Very good   In general, how would you rate your physical health? Very good   In general, how would you rate your mental health, including your mood and your ability to think? Good   In general, how would you rate your satisfaction with your social activities and relationships? Good   In general, please rate how well you carry out your usual social activities and roles Very good   To what extent are you able to carry out your everyday physical activities such as walking, climbing stairs, carrying groceries, or moving a chair? Completely   In the past 7 days, how often have you been bothered by emotional problems such as feeling anxious, depressed, or irritable? Often   In the past 7 days, how would you rate your fatigue on average? Mild   In the past 7 days, how would you rate your pain on average, where 0 means no pain, and 10 means worst imaginable pain? 1   In general, would you say your health is: 4   In general, would you say your quality of life is: 4   In general, how would you rate your physical health? 4   In general, how would you rate your mental health, including your mood and your ability to think? 3   In general, how would you rate your satisfaction with your social activities and relationships? 3   In general, please rate how well you carry out your usual social activities and roles. (This includes  "activities at home, at work and in your community, and responsibilities as a parent, child, spouse, employee, friend, etc.) 4   To what extent are you able to carry out your everyday physical activities such as walking, climbing stairs, carrying groceries, or moving a chair? 5   In the past 7 days, how often have you been bothered by emotional problems such as feeling anxious, depressed, or irritable? 4   In the past 7 days, how would you rate your fatigue on average? 2   In the past 7 days, how would you rate your pain on average, where 0 means no pain, and 10 means worst imaginable pain? 1   Global Mental Health Score 12    Global Physical Health Score 17    PROMIS TOTAL - SUBSCORES 29        Referral / Collaboration:  Referral to another professional/service is not indicated at this time..    Anticipated number of session for this episode of care: 9-12 sessions  Anticipation frequency of session: Weekly  Anticipated Duration of each session: 38-52 minutes  Treatment plan will be reviewed in 90 days or when goals have been changed.       MeasurableTreatment Goal(s) related to diagnosis / functional impairment(s)  Goal 1: Patient will reduce her Vito-7 score by at least 2 points.    I will know I've met my goal when recognize I have thoughts and feelings related to anxiety and not have them hurt me.\"    Objective #A (Patient Action)    Patient will identify at least 3 triggers for anxiety.  Status: New - Date: 3/13/2025      Intervention(s)  Therapist will teach  mindfulness skills.    Objective #B  Patient will attend and participate in social or recreational activities .  Status: New - Date: 3/13/2025      Intervention(s)  Therapist will provide hw assignments on behavioral activation and exposure.    Objective #C  Patient will use cognitive strategies identified in therapy to challenge anxious thoughts.  Status: New - Date: 3/13/2025      Intervention(s)  Therapist will provide processed based ACT " therapy.        Patient has reviewed and agreed to the above plan.      Suze Goff MA [facility]  March 13, 2025

## 2025-03-22 ASSESSMENT — ANXIETY QUESTIONNAIRES
IF YOU CHECKED OFF ANY PROBLEMS ON THIS QUESTIONNAIRE, HOW DIFFICULT HAVE THESE PROBLEMS MADE IT FOR YOU TO DO YOUR WORK, TAKE CARE OF THINGS AT HOME, OR GET ALONG WITH OTHER PEOPLE: NOT DIFFICULT AT ALL
4. TROUBLE RELAXING: SEVERAL DAYS
1. FEELING NERVOUS, ANXIOUS, OR ON EDGE: SEVERAL DAYS
5. BEING SO RESTLESS THAT IT IS HARD TO SIT STILL: NOT AT ALL
6. BECOMING EASILY ANNOYED OR IRRITABLE: NOT AT ALL
GAD7 TOTAL SCORE: 3
2. NOT BEING ABLE TO STOP OR CONTROL WORRYING: NOT AT ALL
8. IF YOU CHECKED OFF ANY PROBLEMS, HOW DIFFICULT HAVE THESE MADE IT FOR YOU TO DO YOUR WORK, TAKE CARE OF THINGS AT HOME, OR GET ALONG WITH OTHER PEOPLE?: NOT DIFFICULT AT ALL
7. FEELING AFRAID AS IF SOMETHING AWFUL MIGHT HAPPEN: NOT AT ALL
GAD7 TOTAL SCORE: 3
7. FEELING AFRAID AS IF SOMETHING AWFUL MIGHT HAPPEN: NOT AT ALL
3. WORRYING TOO MUCH ABOUT DIFFERENT THINGS: SEVERAL DAYS
GAD7 TOTAL SCORE: 3

## 2025-03-26 ENCOUNTER — VIRTUAL VISIT (OUTPATIENT)
Dept: INTERNAL MEDICINE | Facility: CLINIC | Age: 55
End: 2025-03-26
Attending: NURSE PRACTITIONER
Payer: COMMERCIAL

## 2025-03-26 DIAGNOSIS — J34.3 NASAL TURBINATE HYPERTROPHY: ICD-10-CM

## 2025-03-26 DIAGNOSIS — R09.81 CONGESTION OF PARANASAL SINUS: Primary | ICD-10-CM

## 2025-03-26 DIAGNOSIS — F41.9 ANXIETY: ICD-10-CM

## 2025-03-26 DIAGNOSIS — R03.0 ELEVATED BLOOD PRESSURE READING WITHOUT DIAGNOSIS OF HYPERTENSION: ICD-10-CM

## 2025-03-26 PROCEDURE — 98006 SYNCH AUDIO-VIDEO EST MOD 30: CPT | Performed by: NURSE PRACTITIONER

## 2025-03-26 PROCEDURE — 96127 BRIEF EMOTIONAL/BEHAV ASSMT: CPT | Mod: 95 | Performed by: NURSE PRACTITIONER

## 2025-03-26 NOTE — PATIENT INSTRUCTIONS
For sinus congestion:  -OK to try Afrin. Max dose 2x/day for 3 days. Do not use longer; will cause rebound congestion.  -Daily second generation antihistamine like loratadine (Claritin), cetrizine (Zyrtec), or fexofenadine (Allegra) as needed  -saline sinus rinse 1-2x/day    Schedule sinus CT: [phone]    Referral to ENT; they will call you to schedule.

## 2025-03-26 NOTE — PROGRESS NOTES
Lady is a 54 year old who is being evaluated via a billable video visit.    How would you like to obtain your AVS? MyChart  If the video visit is dropped, the invitation should be resent by: Text to cell phone: [phone]  Will anyone else be joining your video visit? No    Assessment & Plan     (R09.81) Congestion of paranasal sinus  (primary encounter diagnosis)  (J34.3) Nasal turbinate hypertrophy  Comment:   Patient Instructions   For sinus congestion:  -OK to try Afrin. Max dose 2x/day for 3 days. Do not use longer; will cause rebound congestion.  -Daily second generation antihistamine like loratadine (Claritin), cetrizine (Zyrtec), or fexofenadine (Allegra) as needed  -saline sinus rinse 1-2x/day    Schedule sinus CT: [phone]    Referral to ENT; they will call you to schedule.    (F41.9) Anxiety  Comment: Improved since LOV.  Hydroxyzine has been helpful. No adverse effects. Has not had to use it in the last 10 days.  Counseling is also helpful.  Continue current plan of care.    (R03.0) Elevated blood pressure reading without diagnosis of hypertension  Comment: Has not been checking home BP.  Will be seen in person 4/16/25 for annual exam; will follow up BP at that time.     The longitudinal plan of care for the diagnosis(es)/condition(s) as documented were addressed during this visit. Due to the added complexity in care, I will continue to support Lady in the subsequent management and with ongoing continuity of care.      Subjective   Lady is a 54 year old, presenting for the following health issues:    Anxiety    History of Present Illness       Mental Health Follow-up:  Patient presents to follow-up on Anxiety.    Patient's anxiety since last visit has been:  Better  The patient is not having other symptoms associated with anxiety.  Any significant life events: No  Patient is not feeling anxious or having panic attacks.  Patient has no concerns about alcohol or drug use.         This is a follow-up  "to virtual visit 2/26/25.  PRN hydroxyzine was started at that time. She had declined a daily antidepressant/antianxiety medication at that time.  Referred to counseling.    Since LOV, she has been feeling improved.  Hydroxyzine was helpful, both when she took it and also \"to know that I had it.\"  Hasn't needed to use hydroxyzine in the last 10 days.  Has had 2 counseling sessions; she is cautiously optimistic   Trying to \"reframe my thinking and form new habits of thought.\"    Additional concerns addressed today:  \"I still think I have something going on with my sinuses.\"  Symptoms are mild and persistent since she had a sinus infection in 12/2024, treated with antibiotics.  She was subsequently seen 1/11/2025 and diagnosed with nasal turbinate hypertrophy; Flonase recommended.  She was further evaluated 1/20/2025. Sinus CT was ordered but has not yet been scheduled.  She is wondering what else she can try. Specifically inquires about Nettipot and Afrin.  2. History of elevated BP without diagnosis of HTN. Is not taking antihypertensive medication. Has not been checking home BP. Has an in-person annual exam scheduled for 4/16/25; will plan to recheck BP at that time.          2/26/2025    11:27 AM 3/3/2025     7:18 AM   PHQ-9 SCORE   PHQ-9 Total Score MyChart 1 (Minimal depression) 1 (Minimal depression)   PHQ-9 Total Score 1  1        Patient-reported         2/25/2025     2:38 PM 3/22/2025     7:43 AM   RICKY-7 SCORE   Total Score 5 (mild anxiety) 3 (minimal anxiety)   Total Score 5  3        Patient-reported       Review of Systems  Constitutional, HEENT, cardiovascular, pulmonary, GI, , musculoskeletal, neuro, skin, endocrine and psych systems are negative, except as otherwise noted.      Objective    Vitals - Patient Reported  Weight (Patient Reported): 63.5 kg (140 lb)    Vitals:  No vitals were obtained today due to virtual visit.    Physical Exam   GENERAL: alert and no distress  EYES: Eyes grossly normal " to inspection.  No discharge or erythema, or obvious scleral/conjunctival abnormalities.  HENT: Nasal septum deviation. Unable to evaluate further due to virtual visit  RESP: No audible wheeze, cough, or visible cyanosis.    SKIN: Visible skin clear. No significant rash, abnormal pigmentation or lesions.  NEURO: Cranial nerves grossly intact.  Mentation and speech appropriate for age.  PSYCH: Appropriate affect, tone, and pace of words      Video-Visit Details    Type of service:  Video Visit   Originating Location (pt. Location): Home  Distant Location (provider location):  On-site  Platform used for Video Visit: Dottie  Signed Electronically by: JOHN Schroeder CNP

## 2025-04-01 SDOH — HEALTH STABILITY: PHYSICAL HEALTH: ON AVERAGE, HOW MANY MINUTES DO YOU ENGAGE IN EXERCISE AT THIS LEVEL?: 30 MIN

## 2025-04-01 SDOH — HEALTH STABILITY: PHYSICAL HEALTH: ON AVERAGE, HOW MANY DAYS PER WEEK DO YOU ENGAGE IN MODERATE TO STRENUOUS EXERCISE (LIKE A BRISK WALK)?: 6 DAYS

## 2025-04-01 ASSESSMENT — SOCIAL DETERMINANTS OF HEALTH (SDOH): HOW OFTEN DO YOU GET TOGETHER WITH FRIENDS OR RELATIVES?: THREE TIMES A WEEK

## 2025-04-02 ENCOUNTER — OFFICE VISIT (OUTPATIENT)
Dept: INTERNAL MEDICINE | Facility: CLINIC | Age: 55
End: 2025-04-02
Payer: COMMERCIAL

## 2025-04-02 ENCOUNTER — ORDERS ONLY (AUTO-RELEASED) (OUTPATIENT)
Dept: INTERNAL MEDICINE | Facility: CLINIC | Age: 55
End: 2025-04-02

## 2025-04-02 VITALS
SYSTOLIC BLOOD PRESSURE: 132 MMHG | BODY MASS INDEX: 22.85 KG/M2 | HEIGHT: 66 IN | HEART RATE: 73 BPM | RESPIRATION RATE: 22 BRPM | TEMPERATURE: 98.4 F | DIASTOLIC BLOOD PRESSURE: 88 MMHG | OXYGEN SATURATION: 100 % | WEIGHT: 142.2 LBS

## 2025-04-02 DIAGNOSIS — Z12.31 ENCOUNTER FOR SCREENING MAMMOGRAM FOR BREAST CANCER: ICD-10-CM

## 2025-04-02 DIAGNOSIS — Z11.3 ROUTINE SCREENING FOR STI (SEXUALLY TRANSMITTED INFECTION): ICD-10-CM

## 2025-04-02 DIAGNOSIS — F41.9 ANXIETY: ICD-10-CM

## 2025-04-02 DIAGNOSIS — Z12.11 SCREEN FOR COLON CANCER: ICD-10-CM

## 2025-04-02 DIAGNOSIS — R03.0 ELEVATED BLOOD PRESSURE READING WITHOUT DIAGNOSIS OF HYPERTENSION: ICD-10-CM

## 2025-04-02 DIAGNOSIS — Z00.00 ANNUAL PHYSICAL EXAM: Primary | ICD-10-CM

## 2025-04-02 DIAGNOSIS — H61.22 IMPACTED CERUMEN OF LEFT EAR: ICD-10-CM

## 2025-04-02 DIAGNOSIS — N95.1 MENOPAUSAL SYNDROME (HOT FLASHES): ICD-10-CM

## 2025-04-02 DIAGNOSIS — Z12.4 CERVICAL CANCER SCREENING: ICD-10-CM

## 2025-04-02 DIAGNOSIS — N84.1 CERVICAL POLYP: ICD-10-CM

## 2025-04-02 LAB
HBV CORE AB SERPL QL IA: NONREACTIVE
HBV SURFACE AG SERPL QL IA: NONREACTIVE
TSH SERPL DL<=0.005 MIU/L-ACNC: 2.41 UIU/ML (ref 0.3–4.2)

## 2025-04-02 PROCEDURE — 86706 HEP B SURFACE ANTIBODY: CPT | Performed by: NURSE PRACTITIONER

## 2025-04-02 PROCEDURE — 90471 IMMUNIZATION ADMIN: CPT | Performed by: NURSE PRACTITIONER

## 2025-04-02 PROCEDURE — 87340 HEPATITIS B SURFACE AG IA: CPT | Performed by: NURSE PRACTITIONER

## 2025-04-02 PROCEDURE — 3075F SYST BP GE 130 - 139MM HG: CPT | Performed by: NURSE PRACTITIONER

## 2025-04-02 PROCEDURE — 99459 PELVIC EXAMINATION: CPT | Performed by: NURSE PRACTITIONER

## 2025-04-02 PROCEDURE — 84443 ASSAY THYROID STIM HORMONE: CPT | Performed by: NURSE PRACTITIONER

## 2025-04-02 PROCEDURE — 86704 HEP B CORE ANTIBODY TOTAL: CPT | Performed by: NURSE PRACTITIONER

## 2025-04-02 PROCEDURE — 87624 HPV HI-RISK TYP POOLED RSLT: CPT | Performed by: NURSE PRACTITIONER

## 2025-04-02 PROCEDURE — 99214 OFFICE O/P EST MOD 30 MIN: CPT | Mod: 25 | Performed by: NURSE PRACTITIONER

## 2025-04-02 PROCEDURE — 36415 COLL VENOUS BLD VENIPUNCTURE: CPT | Performed by: NURSE PRACTITIONER

## 2025-04-02 PROCEDURE — 90677 PCV20 VACCINE IM: CPT | Performed by: NURSE PRACTITIONER

## 2025-04-02 PROCEDURE — 99396 PREV VISIT EST AGE 40-64: CPT | Mod: 25 | Performed by: NURSE PRACTITIONER

## 2025-04-02 PROCEDURE — 3079F DIAST BP 80-89 MM HG: CPT | Performed by: NURSE PRACTITIONER

## 2025-04-02 NOTE — PATIENT INSTRUCTIONS
[facility] colon cancer screening test ordered.  Complete the test by following the instructions. Colog[employer] has a 24/7 help number ([phone]) that you can to walk you through the collection steps if you feel confused or overwhelmed when trying to collect the sample.  Return the kit via [employer]. You can arrange to have UPS pick it up from your doorstep if you prefer.    Order is good for 1 year. Call [phone] if you need a new kit in the next year; insurance is only charged when the sample is received, not each time you request a new kit.    If result is negative, plan to repeat testing in 3 years.  If result positive, will refer you to gastroenterology for follow up evaluation.    Comparison of colon cancer screening methods:  FIT: Accuracy detecting colon cancer 74%. False positive rate 5%.   Cologard: Accuracy detecting colon cancer 92%. False positive rate 13%.   Colonoscopy: Accuracy rate up to 99% (gold standard for colon cancer screening)    Ultimately, the best screening method for you is the one that you do!    Go to any pharmacy to update your shingles vaccine. We can also update this at your next in-person appointment, or you can make an RN vaccine-only appointment.      Patient Education   Preventive Care Advice   This is general advice given by our system to help you stay healthy. However, your care team may have specific advice just for you. Please talk to your care team about your preventive care needs.  Nutrition  Eat 5 or more servings of fruits and vegetables each day.  Try wheat bread, brown rice and whole grain pasta (instead of white bread, rice, and pasta).  Get enough calcium and vitamin D. Check the label on foods and aim for 100% of the RDA (recommended daily allowance).  Lifestyle  Exercise at least 150 minutes each week  (30 minutes a day, 5 days a week).  Do muscle strengthening activities 2 days a week. These help control your weight and prevent disease.  No smoking.  Wear sunscreen  to prevent skin cancer.  Have a dental exam and cleaning every 6 months.  Yearly exams  See your health care team every year to talk about:  Any changes in your health.  Any medicines your care team has prescribed.  Preventive care, family planning, and ways to prevent chronic diseases.  Shots (vaccines)   HPV shots (up to age 26), if you've never had them before.  Hepatitis B shots (up to age 59), if you've never had them before.  COVID-19 shot: Get this shot when it's due.  Flu shot: Get a flu shot every year.  Tetanus shot: Get a tetanus shot every 10 years.  Pneumococcal, hepatitis A, and RSV shots: Ask your care team if you need these based on your risk.  Shingles shot (for age 50 and up)  General health tests  Diabetes screening:  Starting at age 35, Get screened for diabetes at least every 3 years.  If you are younger than age 35, ask your care team if you should be screened for diabetes.  Cholesterol test: At age 39, start having a cholesterol test every 5 years, or more often if advised.  Bone density scan (DEXA): At age 50, ask your care team if you should have this scan for osteoporosis (brittle bones).  Hepatitis C: Get tested at least once in your life.  STIs (sexually transmitted infections)  Before age 24: Ask your care team if you should be screened for STIs.  After age 24: Get screened for STIs if you're at risk. You are at risk for STIs (including HIV) if:  You are sexually active with more than one person.  You don't use condoms every time.  You or a partner was diagnosed with a sexually transmitted infection.  If you are at risk for HIV, ask about PrEP medicine to prevent HIV.  Get tested for HIV at least once in your life, whether you are at risk for HIV or not.  Cancer screening tests  Cervical cancer screening: If you have a cervix, begin getting regular cervical cancer screening tests starting at age 21.  Breast cancer scan (mammogram): If you've ever had breasts, begin having regular  mammograms starting at age 40. This is a scan to check for breast cancer.  Colon cancer screening: It is important to start screening for colon cancer at age 45.  Have a colonoscopy test every 10 years (or more often if you're at risk) Or, ask your provider about stool tests like a FIT test every year or Cologuard test every 3 years.  To learn more about your testing options, visit:   .  For help making a decision, visit:   https://bit.ly/dl89274.  Prostate cancer screening test: If you have a prostate, ask your care team if a prostate cancer screening test (PSA) at age 55 is right for you.  Lung cancer screening: If you are a current or former smoker ages 50 to 80, ask your care team if ongoing lung cancer screenings are right for you.  For informational purposes only. Not to replace the advice of your health care provider. Copyright   2023 [employer]. All rights reserved. Clinically reviewed by the [facility] Transitions Program. Scientific Intake 210180 - REV 01/24.  Learning About Stress  What is stress?     Stress is your body's response to a hard situation. Your body can have a physical, emotional, or mental response. Stress is a fact of life for most people, and it affects everyone differently. What causes stress for you may not be stressful for someone else.  A lot of things can cause stress. You may feel stress when you go on a job interview, take a test, or run a race. This kind of short-term stress is normal and even useful. It can help you if you need to work hard or react quickly. For example, stress can help you finish an important job on time.  Long-term stress is caused by ongoing stressful situations or events. Examples of long-term stress include long-term health problems, ongoing problems at work, or conflicts in your family. Long-term stress can harm your health.  How does stress affect your health?  When you are stressed, your body responds as though you are in danger. It makes  hormones that speed up your heart, make you breathe faster, and give you a burst of energy. This is called the fight-or-flight stress response. If the stress is over quickly, your body goes back to normal and no harm is done.  But if stress happens too often or lasts too long, it can have bad effects. Long-term stress can make you more likely to get sick, and it can make symptoms of some diseases worse. If you tense up when you are stressed, you may develop neck, shoulder, or low back pain. Stress is linked to high blood pressure and heart disease.  Stress also harms your emotional health. It can make you chi, tense, or depressed. Your relationships may suffer, and you may not do well at work or school.  What can you do to manage stress?  You can try these things to help manage stress:   Do something active. Exercise or activity can help reduce stress. Walking is a great way to get started. Even everyday activities such as housecleaning or yard work can help.  Try yoga or ferny chi. These techniques combine exercise and meditation. You may need some training at first to learn them.  Do something you enjoy. For example, listen to music or go to a movie. Practice your hobby or do volunteer work.  Meditate. This can help you relax, because you are not worrying about what happened before or what may happen in the future.  Do guided imagery. Imagine yourself in any setting that helps you feel calm. You can use online videos, books, or a teacher to guide you.  Do breathing exercises. For example:  From a standing position, bend forward from the waist with your knees slightly bent. Let your arms dangle close to the floor.  Breathe in slowly and deeply as you return to a standing position. Roll up slowly and lift your head last.  Hold your breath for just a few seconds in the standing position.  Breathe out slowly and bend forward from the waist.  Let your feelings out. Talk, laugh, cry, and express anger when you need to.  "Talking with supportive friends or family, a counselor, or a [place] leader about your feelings is a healthy way to relieve stress. Avoid discussing your feelings with people who make you feel worse.  Write. It may help to write about things that are bothering you. This helps you find out how much stress you feel and what is causing it. When you know this, you can find better ways to cope.  What can you do to prevent stress?  You might try some of these things to help prevent stress:  Manage your time. This helps you find time to do the things you want and need to do.  Get enough sleep. Your body recovers from the stresses of the day while you are sleeping.  Get support. Your family, friends, and community can make a difference in how you experience stress.  Limit your news feed. Avoid or limit time on social media or news that may make you feel stressed.  Do something active. Exercise or activity can help reduce stress. Walking is a great way to get started.  Where can you learn more?  Go to https://www.[employer].net/patiented  Enter N032 in the search box to learn more about \"Learning About Stress.\"  Current as of: October 24, 2024  Content Version: 14.4    1571-1908 [employer].   Care instructions adapted under license by your healthcare professional. If you have questions about a medical condition or this instruction, always ask your healthcare professional. [employer] disclaims any warranty or liability for your use of this information.    Substance Use Disorder: Care Instructions  Overview     You can improve your life and health by stopping your use of alcohol or drugs. When you don't drink or use drugs, you may feel and sleep better. You may get along better with your family, friends, and coworkers. There are medicines and programs that can help with substance use disorder.  How can you care for yourself at home?  Here are some ways to help you stay sober and prevent relapse.  If you " have been given medicine to help keep you sober or reduce your cravings, be sure to take it exactly as prescribed.  Talk to your doctor about programs that can help you stop using drugs or drinking alcohol.  Do not keep alcohol or drugs in your home.  Plan ahead. Think about what you'll say if other people ask you to drink or use drugs. Try not to spend time with people who drink or use drugs.  Use the time and money spent on drinking or drugs to do something that's important to you.  Preventing a relapse  Have a plan to deal with relapse. Learn to recognize changes in your thinking that lead you to drink or use drugs. Get help before you start to drink or use drugs again.  Try to stay away from situations, friends, or places that may lead you to drink or use drugs.  If you feel the need to drink alcohol or use drugs again, seek help right away. Call a trusted friend or family member. Some people get support from organizations such as Narcotics Anonymous or [employer] or from treatment facilities.  If you relapse, get help as soon as you can. Some people make a plan with another person that outlines what they want that person to do for them if they relapse. The plan usually includes how to handle the relapse and who to notify in case of relapse.  Don't give up. Remember that a relapse doesn't mean that you have failed. Use the experience to learn the triggers that lead you to drink or use drugs. Then quit again. Recovery is a lifelong process. Many people have several relapses before they are able to quit for good.  Follow-up care is a key part of your treatment and safety. Be sure to make and go to all appointments, and call your doctor if you are having problems. It's also a good idea to know your test results and keep a list of the medicines you take.  When should you call for help?   Call 911  anytime you think you may need emergency care. For example, call if you or someone else:    Has overdosed or has  "withdrawal signs. Be sure to tell the emergency workers that you are or someone else is using or trying to quit using drugs. Overdose or withdrawal signs may include:  Losing consciousness.  Seizure.  Seeing or hearing things that aren't there (hallucinations).     Is thinking or talking about suicide or harming others.   Where to get help 24 hours a day, 7 days a week   If you or someone you know talks about suicide, self-harm, a mental health crisis, a substance use crisis, or any other kind of emotional distress, get help right away. You can:    Call the Suicide and Crisis Lifeline at 478.     Call 3-715-004-TALK (1-144.911.6039).     Text HOME to 659013 to access the Crisis Text Line.   Consider saving these numbers in your phone.  Go to [employer] for more information or to chat online.  Call your doctor now or seek immediate medical care if:    You are having withdrawal symptoms. These may include nausea or vomiting, sweating, shakiness, and anxiety.   Watch closely for changes in your health, and be sure to contact your doctor if:    You have a relapse.     You need more help or support to stop.   Where can you learn more?  Go to https://www.[employer].net/patiented  Enter H573 in the search box to learn more about \"Substance Use Disorder: Care Instructions.\"  Current as of: August 20, 2024  Content Version: 14.4    5741-6992 [employer].   Care instructions adapted under license by your healthcare professional. If you have questions about a medical condition or this instruction, always ask your healthcare professional. [employer] disclaims any warranty or liability for your use of this information.       "

## 2025-04-02 NOTE — TELEPHONE ENCOUNTER
REFERRAL INFORMATION:  Referring By: Mandie Syed APRN CNP   Referring Clinic: [facility]   Reason for Visit/Diagnosis: Nasal turbinate hypertrophy, congestion of paranasal sinus, apt per Pt, [place] verified    FUTURE VISIT INFORMATION:  Appointment Date: 6/10/25  Appointment Time: 11AM    NOTES STATUS DETAILS   OFFICE NOTE from referring provider   Internal  3/26/25- OV w.  Mandie Syed APRN CNP @ [facility]    OFFICE NOTE from other specialist Internal & CE  1/20/25- ov w. Jillian Kemp PA-C @ [facility]    1/11/25- OV w. Aleida Petit PA-C @ [facility]     12/26/24- Ov w. Tommy cShaeffer MD  @ [facility]    IMAGES *pertaining images & report*       CT, MRI, PET, NM, US, XRAYS Internal  4/4/25- CT Sinus

## 2025-04-02 NOTE — PROGRESS NOTES
Preventive Care Visit  [facility]  JOHN Schroeder CNP, Internal Medicine  Apr 2, 2025    Assessment & Plan     (Z00.00) Annual physical exam  (primary encounter diagnosis)  Comment: Past medical history, surgical history, family history, social history, sexual history, medications, allergies, and immunizations reviewed and updated as appropriate.   Care gaps addressed, including routine screenings. She will update shingles vaccine at her pharmacy.  Annual lab work ordered.  Physical exam unremarkable, except as noted.  Diet/exercise recommendations discussed.    (Z12.4) Cervical cancer screening  Comment: Pap updated today. No history of abnormal, per patient and chart review.  It is unclear when last pap was completed; she thinks it was more recent than last documented pap in 2010, but does not know when it would have been.  Plan: HPV and Gynecologic Cytology Panel -         Recommended Age 30 - 65 Years    (N84.1) Cervical polyp  Comment: Incidental finding on exam.  Denies postmenopausal bleeding.  Refer to OB/GYN for removal.  Plan: Ob/Gyn  Referral    (R03.0) Elevated blood pressure reading without diagnosis of hypertension  Comment: Home trend consistently below clinic trend, per patient.  Antihypertensive medication is not yet required.  Continue home blood sugar checks.    (F41.9) Anxiety  Comment: Stable on current regimen.  Labs ordered for screening.  Plan: TSH with free T4 reflex    (N95.1) Menopausal syndrome (hot flashes)  Comment: Stable on current regimen.  Discussed trying to wean at annual visit next year, since menopause occurred around age 49.  She has only been on hormone replacement therapy for the last year to year and a half.    (H61.22) Impacted cerumen of left ear  Comment: Declines intervention today.  Has CT and ENT scheduled to follow-up chronic sinus congestion.    (Z12.11) Screen for colon cancer  Comment: Has never had colon cancer  screening.  Informed consent completed today.  She would like to proceed with Cologuamelinda.  Plan: COLOGUARD([employer])    (Z11.3) Routine screening for STI (sexually transmitted infection)  Comment: Labs ordered for screening.  Plan: Hepatitis B surface antigen, Hepatitis B core         antibody, Hepatitis B Surface Antibody    (Z12.31) Encounter for screening mammogram for breast cancer  Comment: Mammogram ordered.  Prefers to complete every other year.  No personal or family history of breast cancer.  Plan: MA Screen Bilateral w/Wilmer     Patient has been advised of split billing requirements and indicates understanding: Yes    Counseling  Appropriate preventive services were addressed with this patient via screening, questionnaire, or discussion as appropriate for fall prevention, nutrition, physical activity, Tobacco-use cessation, social engagement, weight loss and cognition.  Checklist reviewing preventive services available has been given to the patient.  Reviewed patient's diet, addressing concerns and/or questions.   She is at risk for psychosocial distress and has been provided with information to reduce risk.     See Patient Instructions        Zaire Narayanan is a 54 year old, presenting for the following:  Physical    Here for annual exam.    The following concerns were also addressed today:  Blood pressure: Elevated clinic trend. Home SBP typically beetween 120 and 130. Not taking antihypertensive medication.  Mood: Stable on current regimen. Working with a counselor, which has been helpful.  Ear pressure/congestion: Has a sinus CT upcoming. ENT appointment is scheduled for July; she is on a cancellation list.    Health Maintenance:  Vaccines due: pneumococcal, Zoster, and will update pneumococcal today  Mammogram: Mammogram Screening: Recommended mammography every 1-2 years with patient discussion and risk factor consideration. Family history of breast cancer?: no  Pap: Last: unclear; will update  today. History of abnormal?: no. Colposcopy: no. LEEP: no.   Family history of uterine cancer: no; ovarian cancer: no.  Colon cancer screening: Due; has never had colon cancer screening. Family history of colon cancer?: no  DEXA:   Fracture Risk Assessment Tool   : 1970  Sex: female  Weight (kg): 64.5 kg (actual weight)  Height (cm): 167.6 cm  Previous Fragility Fracture:  No  History of parent with fractured hip:  No  Current Smoking:  No  Patient has been on glucocorticoids for more than 3 months (5mg/day or more): No  Rheumatoid Arthritis on Problem List:  No  Secondary Osteoporosis on Problem List:  No  Consumes 3 or more units of alcohol per day: No  Femoral Neck BMD (g/cm2)      2025   FRAX Calculated Score- 10yr Fracture Probability (%)   Major Osteoporotic- without BMD 5.8 %   Hip Fracture- without BMD 0.5 %     Screening chest CT: N/A. Family history of lung cancer?: no  AAA screen: N/A  Vision: No concerns. Wears reading glasses; last exam was 10/2024.  Dental: No concerns. Last dental exam was 2025.  Hearing: No concerns.     Sexual Health History:  Partners:Male; not currently sexually active  Contraception: post-menopausal  Menstrual history: Menopause: 49 years old  Perimenopausal symptoms?: no perimenopausal symptoms with HRT. Has been on this for 1-1.5 years.           2025   General Health   How would you rate your overall physical health? Good   Feel stress (tense, anxious, or unable to sleep) To some extent   (!) STRESS CONCERN      2025   Nutrition   Three or more servings of calcium each day? Yes   Diet: Regular (no restrictions)   How many servings of fruit and vegetables per day? (!) 0-1   How many sweetened beverages each day? 0-1         2025   Exercise   Days per week of moderate/strenous exercise 6 days   Average minutes spent exercising at this level 30 min         2025   Social Factors   Frequency of gathering with friends or relatives Three times a week    Worry food won't last until get money to buy more No   Food not last or not have enough money for food? No   Do you have housing? (Housing is defined as stable permanent housing and does not include staying ouside in a car, in a tent, in an abandoned building, in an overnight shelter, or couch-surfing.) Yes   Are you worried about losing your housing? No   Lack of transportation? No   Unable to get utilities (heat,electricity)? No         4/1/2025   Fall Risk   Fallen 2 or more times in the past year? No   Trouble with walking or balance? No          4/1/2025   Dental   Dentist two times every year? Yes     Today's PHQ-2 Score:       1/20/2025     8:10 AM   PHQ-2 ( 1999 [employer])   Q1: Little interest or pleasure in doing things 0   Q2: Feeling down, depressed or hopeless 1   PHQ-2 Score 1    Q1: Little interest or pleasure in doing things Not at all   Q2: Feeling down, depressed or hopeless Several days   PHQ-2 Score 1       Patient-reported         4/1/2025   Substance Use   Alcohol more than 3/day or more than 7/wk No   Do you use any other substances recreationally? (!) OTHER     Social History     Tobacco Use    Smoking status: Never    Smokeless tobacco: Never   Vaping Use    Vaping status: Never Used   Substance Use Topics    Alcohol use: Yes     Alcohol/week: 1.0 standard drink of alcohol     Types: 1 Cans of beer per week     Comment: 1 beer per week    Drug use: No         7/20/2023   LAST FHS-7 RESULTS   1st degree relative breast or ovarian cancer No   Any relative bilateral breast cancer No   Any male have breast cancer No   Any ONE woman have BOTH breast AND ovarian cancer No   Any woman with breast cancer before 50yrs No   2 or more relatives with breast AND/OR ovarian cancer No   2 or more relatives with breast AND/OR bowel cancer No         4/1/2025   One time HIV Screening   Previous HIV test? Yes         4/1/2025   STI Screening   New sexual partner(s) since last STI/HIV test? No        ASCVD Risk  "  The ASCVD Risk score (Mallika PETERSON, et al., 2019) failed to calculate for the following reasons:    Cannot find a previous HDL lab    Cannot find a previous total cholesterol lab    Reviewed and updated as needed this visit by Provider   Tobacco   Meds  Problems  Med Hx  Surg Hx  Fam Hx  Soc Hx Sexual   Activity              Review of Systems  Constitutional, HEENT, cardiovascular, pulmonary, GI, , musculoskeletal, neuro, skin, endocrine and psych systems are negative, except as otherwise noted.     Objective    Exam  /88   Pulse 73   Temp 98.4  F (36.9  C) (Temporal)   Resp 22   Ht 1.676 m (5' 6\")   Wt 64.5 kg (142 lb 3.2 oz)   SpO2 100%   BMI 22.95 kg/m     Estimated body mass index is 22.95 kg/m  as calculated from the following:    Height as of this encounter: 1.676 m (5' 6\").    Weight as of this encounter: 64.5 kg (142 lb 3.2 oz).    Physical Exam  Vitals and nursing note reviewed. Chaperone present: DECLINES.   Constitutional:       Appearance: Normal appearance.   HENT:      Head: Normocephalic and atraumatic.      Right Ear: Tympanic membrane, ear canal and external ear normal.      Left Ear: Tympanic membrane and external ear normal. There is impacted cerumen.      Nose: No congestion or rhinorrhea.      Right Sinus: No maxillary sinus tenderness or frontal sinus tenderness.      Left Sinus: No maxillary sinus tenderness or frontal sinus tenderness.      Mouth/Throat:      Pharynx: Oropharynx is clear.      Comments: Cobblestoning without significant erythema or visible post-nasal drip  Eyes:      Extraocular Movements: Extraocular movements intact.      Conjunctiva/sclera: Conjunctivae normal.      Pupils: Pupils are equal, round, and reactive to light.   Neck:      Vascular: No carotid bruit.   Cardiovascular:      Rate and Rhythm: Normal rate and regular rhythm.      Pulses: Normal pulses.      Heart sounds: Normal heart sounds. No murmur heard.     No friction rub. No " gallop.   Pulmonary:      Effort: Pulmonary effort is normal. No respiratory distress.      Breath sounds: Normal breath sounds. No wheezing, rhonchi or rales.   Abdominal:      General: Abdomen is flat. Bowel sounds are normal. There is no distension.      Palpations: Abdomen is soft. There is no mass.      Tenderness: There is no abdominal tenderness. There is no guarding.      Hernia: No hernia is present.   Genitourinary:     General: Normal vulva.      Exam position: Lithotomy position.      Pubic Area: No rash.       Labia:         Right: No rash, tenderness or lesion.         Left: Injury (healed; left labia minora asymmetry related to previous tear) present. No rash, tenderness or lesion.       Vagina: Vaginal discharge (white, creamy discharge without odor) present.      Cervix: Lesion (cervical polyp approx 1 by 2-3 cm) present.   Musculoskeletal:         General: No swelling. Normal range of motion.      Cervical back: Normal range of motion and neck supple.   Skin:     General: Skin is warm and dry.   Neurological:      General: No focal deficit present.      Mental Status: She is alert and oriented to person, place, and time.   Psychiatric:         Mood and Affect: Mood is depressed (somewhat responsive to emotional support). Affect is flat.         Behavior: Behavior normal.         Thought Content: Thought content normal.         Judgment: Judgment normal.       Signed Electronically by: JOHN Schroeder CNP

## 2025-04-03 ENCOUNTER — PATIENT OUTREACH (OUTPATIENT)
Dept: CARE COORDINATION | Facility: CLINIC | Age: 55
End: 2025-04-03
Payer: COMMERCIAL

## 2025-04-03 ENCOUNTER — VIRTUAL VISIT (OUTPATIENT)
Dept: PSYCHOLOGY | Facility: CLINIC | Age: 55
End: 2025-04-03
Payer: COMMERCIAL

## 2025-04-03 DIAGNOSIS — F43.22 ADJUSTMENT DISORDER WITH ANXIETY: Primary | ICD-10-CM

## 2025-04-03 LAB
HBV SURFACE AB SERPL IA-ACNC: <3.5 M[IU]/ML
HBV SURFACE AB SERPL IA-ACNC: NONREACTIVE M[IU]/ML
HPV HR 12 DNA CVX QL NAA+PROBE: NEGATIVE
HPV16 DNA CVX QL NAA+PROBE: NEGATIVE
HPV18 DNA CVX QL NAA+PROBE: NEGATIVE
HUMAN PAPILLOMA VIRUS FINAL DIAGNOSIS: NORMAL

## 2025-04-03 PROCEDURE — 90834 PSYTX W PT 45 MINUTES: CPT | Mod: 95 | Performed by: PSYCHOLOGIST

## 2025-04-03 NOTE — PROGRESS NOTES
"[facility]                                     Progress Note    Patient Name: Lennie More  Date: 4/3/2025         Service Type: Individual      Session Start Time: 03:04  Session End Time: 03:56     Session Length: 52 minutes    Session #: 5    Attendees: Client    Service Modality:  Video Visit:      Provider verified identity through the following two step process.  Patient provided:  Patient is known previously to provider    Telemedicine Visit: The patient's condition can be safely assessed and treated via synchronous audio and visual telemedicine encounter.      Reason for Telemedicine Visit: Patient has requested telehealth visit    Originating Site (Patient Location): Patient's home    Distant Site (Provider Location): Provider Remote Setting- Home Office    Consent:  The patient/guardian has verbally consented to: the potential risks and benefits of telemedicine (video visit) versus in person care; bill my insurance or make self-payment for services provided; and responsibility for payment of non-covered services.     Patient would like the video invitation sent by:  My Chart    Mode of Communication:  Video Conference via Amwell    Distant Location (Provider):  Off-site    As the provider I attest to compliance with applicable laws and regulations related to telemedicine.    DATA  Interactive Complexity: No  Crisis: No        Progress Since Last Session (Related to Symptoms / Goals / Homework):   Symptoms: No change      Homework: Partially completed  \"[place] bathing.\"  Practice noticing Yin, Pabon, and Yuan.  \"Stay away from head lines.\"   Episode of Care Goals: Minimal progress - PREPARATION (Decided to change - considering how); Intervened by negotiating a change plan and determining options / strategies for behavior change, identifying triggers, exploring social supports, and working towards setting a date to begin behavior change     Current / Ongoing Stressors and " Concerns:   Patient reports experiencing more stressors this week. 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 She identified the weather as being a barrier for spending time in nature. During today's session, concepts of mindfulness, values and acceptance were explored.     Treatment Objective(s) Addressed in This Session:   use at least 2 coping skills for anxiety management in the next  week     Intervention:   ACT hexaflex model    Assessments completed prior to visit:  The following assessments were completed by patient for this visit:  PHQ9:       2/26/2025    11:27 AM 3/3/2025     7:18 AM   PHQ-9 SCORE   PHQ-9 Total Score MyChart 1 (Minimal depression) 1 (Minimal depression)   PHQ-9 Total Score 1  1        Patient-reported     GAD7:       2/25/2025     2:38 PM 3/22/2025     7:43 AM   RICKY-7 SCORE   Total Score 5 (mild anxiety) 3 (minimal anxiety)   Total Score 5  3        Patient-reported     CAGE-AID:       3/3/2025     7:30 AM   CAGE-AID Total Score   Total Score 0    Total Score MyChart 0 (A total score of 2 or greater is considered clinically significant)       Patient-reported     PROMIS 10-Global Health (all questions and answers displayed):       3/3/2025     7:29 AM   PROMIS 10   In general, would you say your health is: Very good   In general, would you say your quality of life is: Very good   In general, how would you rate your physical health? Very good   In general, how would you rate your mental health, including your mood and your ability to think? Good   In general, how would you rate your satisfaction with your social activities and relationships? Good   In general, please rate how well you carry out your usual social activities and roles Very good   To what extent are you able to carry out your everyday physical activities such as walking, climbing stairs, carrying groceries, or moving a chair? Completely   In the past 7 days, how often  have you been bothered by emotional problems such as feeling anxious, depressed, or irritable? Often   In the past 7 days, how would you rate your fatigue on average? Mild   In the past 7 days, how would you rate your pain on average, where 0 means no pain, and 10 means worst imaginable pain? 1   In general, would you say your health is: 4   In general, would you say your quality of life is: 4   In general, how would you rate your physical health? 4   In general, how would you rate your mental health, including your mood and your ability to think? 3   In general, how would you rate your satisfaction with your social activities and relationships? 3   In general, please rate how well you carry out your usual social activities and roles. (This includes activities at home, at work and in your community, and responsibilities as a parent, child, spouse, employee, friend, etc.) 4   To what extent are you able to carry out your everyday physical activities such as walking, climbing stairs, carrying groceries, or moving a chair? 5   In the past 7 days, how often have you been bothered by emotional problems such as feeling anxious, depressed, or irritable? 4   In the past 7 days, how would you rate your fatigue on average? 2   In the past 7 days, how would you rate your pain on average, where 0 means no pain, and 10 means worst imaginable pain? 1   Global Mental Health Score 12    Global Physical Health Score 17    PROMIS TOTAL - SUBSCORES 29        Patient-reported     [place] Suicide Severity Rating Scale (Lifetime/Recent)      3/3/2025    12:51 PM   [place] Suicide Severity Rating (Lifetime/Recent)   1. Wish to be Dead (Lifetime) N   2. Non-Specific Active Suicidal Thoughts (Lifetime) N   Actual Attempt (Lifetime) N   Has subject engaged in non-suicidal self-injurious behavior? (Lifetime) N   Interrupted Attempts (Lifetime) N   Aborted or Self-Interrupted Attempt (Lifetime) N   Preparatory Acts or Behavior (Lifetime) N    Calculated C-SSRS Risk Score (Lifetime/Recent) No Risk Indicated         ASSESSMENT: Current Emotional / Mental Status (status of significant symptoms):   Risk status (Self / Other harm or suicidal ideation)   Patient denies current fears or concerns for personal safety.   Patient denies current or recent suicidal ideation or behaviors.   Patient denies current or recent homicidal ideation or behaviors.   Patient denies current or recent self injurious behavior or ideation.   Patient denies other safety concerns.   Patient reports there has been no change in risk factors since their last session.     Patient reports there has been no change in protective factors since their last session.     Recommended that patient call 911 or go to the local ED should there be a change in any of these risk factors     Appearance:   Appropriate    Eye Contact:   Fair    Psychomotor Behavior: Retarded (Slowed)    Attitude:   Attentive   Orientation:   All   Speech    Rate / Production: Emotional    Volume:  Normal    Mood:    Anxious  Depressed  Irritable    Affect:    Constricted  Worrisome    Thought Content:  Rumination    Thought Form:  Coherent    Insight:    External locus     Medication Review:   No changes to current psychiatric medication(s)     Medication Compliance:   Yes     Changes in Health Issues:   None reported     Chemical Use Review:   Substance Use: Chemical use reviewed, no active concerns identified      Tobacco Use: No current tobacco use.      Diagnosis:  1. Adjustment disorder with anxiety        Collateral Reports Completed:   Not Applicable    PLAN: (Patient Tasks / Therapist Tasks / Other)  Practice at least 2 strategies and skills to manage worries.  Reflect on values that are important to you.        Suze Goff MA [facility]                                ______________________________________________________________________  Individual Treatment Plan     Patient's Name: Lennie NÚÑEZ Derik                       YOB: 1970     Date of Creation: 3/13/2025  Date Treatment Plan Last Reviewed/Revised: 3/13/2025     DSM5 Diagnoses: Adjustment Disorders  309.24 (F43.22) With anxiety  Psychosocial / Contextual Factors: Psychosocial Factors:  Medical complexities, Grief and loss and impact to [facility].  Cultural and Contextual Factors: Aging mother who live alone, the passing of two cats last year, political and economic climate, and self-employed.   PROMIS (reviewed every 90 days):       3/3/2025     7:29 AM   PROMIS 10   In general, would you say your health is: Very good   In general, would you say your quality of life is: Very good   In general, how would you rate your physical health? Very good   In general, how would you rate your mental health, including your mood and your ability to think? Good   In general, how would you rate your satisfaction with your social activities and relationships? Good   In general, please rate how well you carry out your usual social activities and roles Very good   To what extent are you able to carry out your everyday physical activities such as walking, climbing stairs, carrying groceries, or moving a chair? Completely   In the past 7 days, how often have you been bothered by emotional problems such as feeling anxious, depressed, or irritable? Often   In the past 7 days, how would you rate your fatigue on average? Mild   In the past 7 days, how would you rate your pain on average, where 0 means no pain, and 10 means worst imaginable pain? 1   In general, would you say your health is: 4   In general, would you say your quality of life is: 4   In general, how would you rate your physical health? 4   In general, how would you rate your mental health, including your mood and your ability to think? 3   In general, how would you rate your satisfaction with your social activities and relationships? 3   In general, please rate how well you carry out your usual  "social activities and roles. (This includes activities at home, at work and in your community, and responsibilities as a parent, child, spouse, employee, friend, etc.) 4   To what extent are you able to carry out your everyday physical activities such as walking, climbing stairs, carrying groceries, or moving a chair? 5   In the past 7 days, how often have you been bothered by emotional problems such as feeling anxious, depressed, or irritable? 4   In the past 7 days, how would you rate your fatigue on average? 2   In the past 7 days, how would you rate your pain on average, where 0 means no pain, and 10 means worst imaginable pain? 1   Global Mental Health Score 12    Global Physical Health Score 17    PROMIS TOTAL - SUBSCORES 29          Referral / Collaboration:  Referral to another professional/service is not indicated at this time..     Anticipated number of session for this episode of care: 9-12 sessions  Anticipation frequency of session: Weekly  Anticipated Duration of each session: 38-52 minutes  Treatment plan will be reviewed in 90 days or when goals have been changed.         MeasurableTreatment Goal(s) related to diagnosis / functional impairment(s)  Goal 1: Patient will reduce her Vito-7 score by at least 2 points.    I will know I've met my goal when recognize I have thoughts and feelings related to anxiety and not have them hurt me.\"     Objective #A (Patient Action)                          Patient will identify at least 3 triggers for anxiety.  Status: New - Date: 3/13/2025       Intervention(s)  Therapist will teach  mindfulness skills.     Objective #B  Patient will attend and participate in social or recreational activities .  Status: New - Date: 3/13/2025       Intervention(s)  Therapist will provide hw assignments on behavioral activation and exposure.     Objective #C  Patient will use cognitive strategies identified in therapy to challenge anxious thoughts.  Status: New - Date: 3/13/2025     "   Intervention(s)  Therapist will provide processed based ACT therapy.           Patient has reviewed and agreed to the above plan.      Suze Goff MA [facility]  April 3, 2025

## 2025-04-04 ENCOUNTER — ANCILLARY PROCEDURE (OUTPATIENT)
Dept: CT IMAGING | Facility: CLINIC | Age: 55
End: 2025-04-04
Attending: PHYSICIAN ASSISTANT
Payer: COMMERCIAL

## 2025-04-04 DIAGNOSIS — J34.89 PAIN OF MAXILLARY SINUS: ICD-10-CM

## 2025-04-04 DIAGNOSIS — R09.81 CONGESTION OF PARANASAL SINUS: ICD-10-CM

## 2025-04-04 PROCEDURE — 70486 CT MAXILLOFACIAL W/O DYE: CPT

## 2025-04-08 LAB
BKR AP ASSOCIATED HPV REPORT: NORMAL
BKR LAB AP GYN ADEQUACY: NORMAL
BKR LAB AP GYN INTERPRETATION: NORMAL
BKR LAB AP LMP: NORMAL
BKR LAB AP PREVIOUS ABNORMAL: NORMAL
PATH REPORT.COMMENTS IMP SPEC: NORMAL
PATH REPORT.COMMENTS IMP SPEC: NORMAL
PATH REPORT.RELEVANT HX SPEC: NORMAL
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
## 2025-04-10 ENCOUNTER — ANCILLARY PROCEDURE (OUTPATIENT)
Dept: MAMMOGRAPHY | Facility: CLINIC | Age: 55
End: 2025-04-10
Attending: NURSE PRACTITIONER
Payer: COMMERCIAL

## 2025-04-10 DIAGNOSIS — Z12.31 ENCOUNTER FOR SCREENING MAMMOGRAM FOR BREAST CANCER: ICD-10-CM

## 2025-04-10 DIAGNOSIS — Z12.31 VISIT FOR SCREENING MAMMOGRAM: ICD-10-CM

## 2025-04-11 ENCOUNTER — MYC MEDICAL ADVICE (OUTPATIENT)
Dept: INTERNAL MEDICINE | Facility: CLINIC | Age: 55
End: 2025-04-11
Payer: COMMERCIAL

## 2025-04-15 ENCOUNTER — E-VISIT (OUTPATIENT)
Dept: INTERNAL MEDICINE | Facility: CLINIC | Age: 55
End: 2025-04-15
Payer: COMMERCIAL

## 2025-04-15 DIAGNOSIS — J01.90 ACUTE BACTERIAL SINUSITIS: Primary | ICD-10-CM

## 2025-04-15 DIAGNOSIS — B96.89 ACUTE BACTERIAL SINUSITIS: Primary | ICD-10-CM

## 2025-04-15 RX ORDER — PSEUDOEPHEDRINE HYDROCHLORIDE 60 MG/1
60 TABLET, FILM COATED ORAL EVERY 4 HOURS PRN
Qty: 20 TABLET | Refills: 0 | Status: SHIPPED | OUTPATIENT
Start: 2025-04-15

## 2025-04-16 ENCOUNTER — MYC REFILL (OUTPATIENT)
Dept: INTERNAL MEDICINE | Facility: CLINIC | Age: 55
End: 2025-04-16

## 2025-04-16 DIAGNOSIS — N95.1 MENOPAUSAL SYNDROME (HOT FLASHES): ICD-10-CM

## 2025-04-16 RX ORDER — ESTRADIOL 1 MG/1
1 TABLET ORAL DAILY
Qty: 90 TABLET | Refills: 1 | OUTPATIENT
Start: 2025-04-16

## 2025-04-16 RX ORDER — PROGESTERONE 100 MG/1
100 CAPSULE ORAL DAILY
Qty: 90 CAPSULE | Refills: 1 | OUTPATIENT
Start: 2025-04-16

## 2025-04-16 NOTE — TELEPHONE ENCOUNTER
Sinus infection on-going x1 week, in the setting of chronic sinus congestion since a sinus infection in 12/2024.  Augmentin BID x7 days.  Sudafed PRN for congestion.  Supportive cares recommended: fluids, rest, OTC symptom reducers. Follow up with worsening or persistent symptoms.     Provider E-Visit time total (minutes): 7 minutes

## 2025-04-16 NOTE — PATIENT INSTRUCTIONS
- Start taking Augmentin, 1 tablet 2x/day x7 days.  (Prescription has been sent to your pharmacy)  - Start taking Sudafed as needed for congestion, sinus pressure.  (Prescription has been sent to your pharmacy)    CONTINUE supportive cares:  - Tylenol or ibuprofen, as needed, for sore throat and body aches related to cough. Take as directed on packaging. Do not exceed 2400 mg ibuprofen or 3000 mg Tylenol from any source in any 24-hr period. Always take ibuprofen with food. Avoid drinking alcohol when taking Tylenol or ibuprofen.  - Warm, salt water gargles 3-4 times a day for sore throat (1/2 tsp salt in 1 cup of warm water).  - Lozenges for throat pain and cough.  - Cold foods and beverages such as ice cubes, popsicles, and ice cream, may help with sore throat.  - Increase your fluid intake. Warm soups, warm tea, and/or hot water with lemon and honey may help with throat pain and be soothing for cough.   - Rest as needed.  - Cetirizine (Zyrtec) 10 mg daily and Mucinex 600 mg twice daily as needed over next 1-2 weeks can help with runny nose and nasal congestion    Make an appointment if symptoms worsen or persist.    To prevent transmission to others:  - Regular hand washing  - Cover your cough  - Stay home while you are feeling ill  - Wear a mask over your nose and mouth when you are in public       Acute Sinusitis: Care Instructions  Overview     Acute sinusitis is an inflammation of the mucous membranes inside the nose and sinuses. Sinuses are the hollow spaces in your skull around the eyes and nose. Acute sinusitis often follows a cold. Acute sinusitis causes thick, discolored mucus that drains from the nose or down the back of the throat. It also can cause pain and pressure in your head and face along with a stuffy or blocked nose.  In most cases, sinusitis gets better on its own in 1 to 2 weeks. But some mild symptoms may last for several weeks. Sometimes antibiotics are needed if there is a bacterial  infection.  Follow-up care is a key part of your treatment and safety. Be sure to make and go to all appointments, and call your doctor if you are having problems. It's also a good idea to know your test results and keep a list of the medicines you take.  How can you care for yourself at home?  Use saline (saltwater) nasal washes. This can help keep your nasal passages open and wash out mucus and allergens.  You can buy saline nose washes at a grocery store or drugstore. Follow the instructions on the package.  You can make your own at home. Add 1 teaspoon of non-iodized salt and 1 teaspoon of baking soda to 2 cups of distilled or boiled and cooled water. Fill a squeeze bottle or a nasal cleansing pot (such as a neti pot) with the nasal wash. Then put the tip into your nostril, and lean over the sink. With your mouth open, gently squirt the liquid. Repeat on the other side.  Try a decongestant nasal spray like oxymetazoline (Afrin). Do not use it for more than 3 days in a row. Using it for more than 3 days can make your congestion worse.  If needed, take an over-the-counter pain medicine, such as acetaminophen (Tylenol), ibuprofen (Advil, Motrin), or naproxen (Aleve). Read and follow all instructions on the label.  If the doctor prescribed antibiotics, take them as directed. Do not stop taking them just because you feel better. You need to take the full course of antibiotics.  Be careful when taking over-the-counter cold or flu medicines and Tylenol at the same time. Many of these medicines have acetaminophen, which is Tylenol. Read the labels to make sure that you are not taking more than the recommended dose. Too much acetaminophen (Tylenol) can be harmful.  Try a steroid nasal spray. It may help with your symptoms.  Breathe warm, moist air. You can use a steamy shower, a hot bath, or a sink filled with hot water. Avoid cold, dry air. Using a humidifier in your home may help. Follow the directions for cleaning the  "machine.  When should you call for help?   Call your doctor now or seek immediate medical care if:    You have new or worse swelling, redness, or pain in your face or around one or both of your eyes.     You have double vision or a change in your vision.     You have a high fever.     You have a severe headache and a stiff neck.     You have mental changes, such as feeling confused or much less alert.   Watch closely for changes in your health, and be sure to contact your doctor if:    You are not getting better as expected.   Where can you learn more?  Go to https://www.[employer].net/patiented  Enter I933 in the search box to learn more about \"Acute Sinusitis: Care Instructions.\"  Current as of: October 27, 2024  Content Version: 14.4    [id number] [employer].   Care instructions adapted under license by your healthcare professional. If you have questions about a medical condition or this instruction, always ask your healthcare professional. Private Company disclaims any warranty or liability for your use of this information.    "

## 2025-04-24 ENCOUNTER — VIRTUAL VISIT (OUTPATIENT)
Dept: PSYCHOLOGY | Facility: CLINIC | Age: 55
End: 2025-04-24
Payer: COMMERCIAL

## 2025-04-24 DIAGNOSIS — F43.22 ADJUSTMENT DISORDER WITH ANXIETY: Primary | ICD-10-CM

## 2025-04-24 PROCEDURE — 90837 PSYTX W PT 60 MINUTES: CPT | Mod: 95 | Performed by: PSYCHOLOGIST

## 2025-04-24 NOTE — PROGRESS NOTES
[facility]                                     Progress Note    Patient Name: Lennie More  Date: 4/24/2025         Service Type: Individual      Session Start Time: 03:01  Session End Time: 03:59     Session Length: 58 minutes    Session #: 8    Attendees: Client    Service Modality:  Video Visit:      Provider verified identity through the following two step process.  Patient provided:  Patient is known previously to provider    Telemedicine Visit: The patient's condition can be safely assessed and treated via synchronous audio and visual telemedicine encounter.      Reason for Telemedicine Visit: Patient has requested telehealth visit    Originating Site (Patient Location): Patient's home    Distant Site (Provider Location): Provider Remote Setting- Home Office    Consent:  The patient/guardian has verbally consented to: the potential risks and benefits of telemedicine (video visit) versus in person care; bill my insurance or make self-payment for services provided; and responsibility for payment of non-covered services.     Patient would like the video invitation sent by:  My Chart    Mode of Communication:  Video Conference via Amwell    Distant Location (Provider):  Off-site    As the provider I attest to compliance with applicable laws and regulations related to telemedicine.    DATA Extended Session (53+ minutes):   - Patient's presenting concerns require more intensive intervention than could be completed within the usual service    Interactive Complexity: No  Crisis: No        Progress Since Last Session (Related to Symptoms / Goals / Homework):   Symptoms: Improving      Homework: Achieved / completed to satisfaction  Radical acceptance practice      Episode of Care Goals: Minimal progress - PREPARATION (Decided to change - considering how); Intervened by negotiating a change plan and determining options / strategies for behavior change, identifying triggers, exploring social  "supports, and working towards behavior change     Current / Ongoing Stressors and Concerns:     Patient and provider met for an individual therapy session today. Patient reviewed her homework practices of radical acceptance. She noted her experience of practicing exercises  such as willing hands and beginning to listen to the audio book Amelia Search for Meaning. Patient reported experiencing less anxiety than the previous weeks. Patient and provider explored concepts related to radical acceptance. Patient reports the the following values are important to her: Compassion, integrity, helping and contributing, putting good back in the world,   intelligence and principles of \"live and let live\" and  \"Infinite diversity and infinite combination.\"    Treatment Objective(s) Addressed in This Session:   use at least 2 coping skills for anxiety management in the next  week     Intervention:   DBT: Radical acceptance    Assessments completed prior to visit:  The following assessments were completed by patient for this visit:  PHQ9:       2/26/2025    11:27 AM 3/3/2025     7:18 AM   PHQ-9 SCORE   PHQ-9 Total Score MyChart 1 (Minimal depression) 1 (Minimal depression)   PHQ-9 Total Score 1  1        Patient-reported     GAD7:       2/25/2025     2:38 PM 3/22/2025     7:43 AM   RICKY-7 SCORE   Total Score 5 (mild anxiety) 3 (minimal anxiety)   Total Score 5  3        Patient-reported     CAGE-AID:       3/3/2025     7:30 AM   CAGE-AID Total Score   Total Score 0    Total Score MyChart 0 (A total score of 2 or greater is considered clinically significant)       Patient-reported     PROMIS 10-Global Health (all questions and answers displayed):       3/3/2025     7:29 AM   PROMIS 10   In general, would you say your health is: Very good   In general, would you say your quality of life is: Very good   In general, how would you rate your physical health? Very good   In general, how would you rate your mental health, including your mood " and your ability to think? Good   In general, how would you rate your satisfaction with your social activities and relationships? Good   In general, please rate how well you carry out your usual social activities and roles Very good   To what extent are you able to carry out your everyday physical activities such as walking, climbing stairs, carrying groceries, or moving a chair? Completely   In the past 7 days, how often have you been bothered by emotional problems such as feeling anxious, depressed, or irritable? Often   In the past 7 days, how would you rate your fatigue on average? Mild   In the past 7 days, how would you rate your pain on average, where 0 means no pain, and 10 means worst imaginable pain? 1   In general, would you say your health is: 4   In general, would you say your quality of life is: 4   In general, how would you rate your physical health? 4   In general, how would you rate your mental health, including your mood and your ability to think? 3   In general, how would you rate your satisfaction with your social activities and relationships? 3   In general, please rate how well you carry out your usual social activities and roles. (This includes activities at home, at work and in your community, and responsibilities as a parent, child, spouse, employee, friend, etc.) 4   To what extent are you able to carry out your everyday physical activities such as walking, climbing stairs, carrying groceries, or moving a chair? 5   In the past 7 days, how often have you been bothered by emotional problems such as feeling anxious, depressed, or irritable? 4   In the past 7 days, how would you rate your fatigue on average? 2   In the past 7 days, how would you rate your pain on average, where 0 means no pain, and 10 means worst imaginable pain? 1   Global Mental Health Score 12    Global Physical Health Score 17    PROMIS TOTAL - SUBSCORES 29        Patient-reported     [place] Suicide Severity Rating Scale  (Lifetime/Recent)      3/3/2025    12:51 PM   [place] Suicide Severity Rating (Lifetime/Recent)   1. Wish to be Dead (Lifetime) N   2. Non-Specific Active Suicidal Thoughts (Lifetime) N   Actual Attempt (Lifetime) N   Has subject engaged in non-suicidal self-injurious behavior? (Lifetime) N   Interrupted Attempts (Lifetime) N   Aborted or Self-Interrupted Attempt (Lifetime) N   Preparatory Acts or Behavior (Lifetime) N   Calculated C-SSRS Risk Score (Lifetime/Recent) No Risk Indicated         ASSESSMENT: Current Emotional / Mental Status (status of significant symptoms):   Risk status (Self / Other harm or suicidal ideation)   Patient denies current fears or concerns for personal safety.   Patient denies current or recent suicidal ideation or behaviors.   Patient denies current or recent homicidal ideation or behaviors.   Patient denies current or recent self injurious behavior or ideation.   Patient denies other safety concerns.   Patient reports there has been no change in risk factors since their last session.     Patient reports there has been no change in protective factors since their last session.     Recommended that patient call 911 or go to the local ED should there be a change in any of these risk factors     Appearance:   Appropriate    Eye Contact:   Fair    Psychomotor Behavior: Normal    Attitude:   Attentive   Orientation:   All   Speech    Rate / Production: Normal     Volume:  Normal    Mood:    Anxious    Affect:    Constricted  Worrisome    Thought Content:  Clear    Thought Form:  Coherent    Insight:    Good      Medication Review:   No changes to current psychiatric medication(s)     Medication Compliance:   Yes     Changes in Health Issues:   None reported     Chemical Use Review:   Substance Use: Chemical use reviewed, no active concerns identified      Tobacco Use: No current tobacco use.      Diagnosis:  1. Adjustment disorder with anxiety        Collateral Reports Completed:   Not  Applicable    PLAN: (Patient Tasks / Therapist Tasks / Other)  Practice radical acceptance  Audio book Amelia search for meaning      Suze Goff MA [facility]                                ______________________________________________________________________  Individual Treatment Plan     Patient's Name: Lennie NÚÑEZ                       YOB: 1970     Date of Creation: 3/13/2025  Date Treatment Plan Last Reviewed/Revised: 3/13/2025     DSM5 Diagnoses: Adjustment Disorders  309.24 (F43.22) With anxiety  Psychosocial / Contextual Factors: Psychosocial Factors:  Medical complexities, Grief and loss and impact to [facility].  Cultural and Contextual Factors: Aging mother who lives alone, the passing of two cats last year, political and economic climate, and self-employed.   PROMIS (reviewed every 90 days):       3/3/2025     7:29 AM   PROMIS 10   In general, would you say your health is: Very good   In general, would you say your quality of life is: Very good   In general, how would you rate your physical health? Very good   In general, how would you rate your mental health, including your mood and your ability to think? Good   In general, how would you rate your satisfaction with your social activities and relationships? Good   In general, please rate how well you carry out your usual social activities and roles Very good   To what extent are you able to carry out your everyday physical activities such as walking, climbing stairs, carrying groceries, or moving a chair? Completely   In the past 7 days, how often have you been bothered by emotional problems such as feeling anxious, depressed, or irritable? Often   In the past 7 days, how would you rate your fatigue on average? Mild   In the past 7 days, how would you rate your pain on average, where 0 means no pain, and 10 means worst imaginable pain? 1   In general, would you say your health is: 4   In general, would you say your  "quality of life is: 4   In general, how would you rate your physical health? 4   In general, how would you rate your mental health, including your mood and your ability to think? 3   In general, how would you rate your satisfaction with your social activities and relationships? 3   In general, please rate how well you carry out your usual social activities and roles. (This includes activities at home, at work and in your community, and responsibilities as a parent, child, spouse, employee, friend, etc.) 4   To what extent are you able to carry out your everyday physical activities such as walking, climbing stairs, carrying groceries, or moving a chair? 5   In the past 7 days, how often have you been bothered by emotional problems such as feeling anxious, depressed, or irritable? 4   In the past 7 days, how would you rate your fatigue on average? 2   In the past 7 days, how would you rate your pain on average, where 0 means no pain, and 10 means worst imaginable pain? 1   Global Mental Health Score 12    Global Physical Health Score 17    PROMIS TOTAL - SUBSCORES 29          Referral / Collaboration:  Referral to another professional/service is not indicated at this time..     Anticipated number of session for this episode of care: 9-12 sessions  Anticipation frequency of session: Weekly  Anticipated Duration of each session: 38-52 minutes  Treatment plan will be reviewed in 90 days or when goals have been changed.         MeasurableTreatment Goal(s) related to diagnosis / functional impairment(s)  Goal 1: Patient will reduce her Vito-7 score by at least 2 points.    I will know I've met my goal when recognize I have thoughts and feelings related to anxiety and not have them hurt me.\"     Objective #A (Patient Action)                          Patient will identify at least 3 triggers for anxiety.  Status: New - Date: 3/13/2025       Intervention(s)  Therapist will teach  mindfulness skills.     Objective #B  Patient " will attend and participate in social or recreational activities .  Status: New - Date: 3/13/2025       Intervention(s)  Therapist will provide hw assignments on behavioral activation and exposure.     Objective #C  Patient will use cognitive strategies identified in therapy to challenge anxious thoughts.  Status: New - Date: 3/13/2025       Intervention(s)  Therapist will provide processed based ACT therapy.           Patient has reviewed and agreed to the above plan.      Suze Goff MA [facility]  April 24, 2025

## 2025-05-01 ENCOUNTER — VIRTUAL VISIT (OUTPATIENT)
Dept: PSYCHOLOGY | Facility: CLINIC | Age: 55
End: 2025-05-01
Payer: COMMERCIAL

## 2025-05-01 DIAGNOSIS — F43.22 ADJUSTMENT DISORDER WITH ANXIETY: Primary | ICD-10-CM

## 2025-05-01 NOTE — PROGRESS NOTES
[facility]                                     Progress Note    Patient Name: Lennie More  Date: 5/01/2025         Service Type: Individual      Session Start Time: 03:01  Session End Time: 03:59     Session Length: 58 minutes    Session #: 8    Attendees: Client    Service Modality:  Video Visit:      Provider verified identity through the following two step process.  Patient provided:  Patient is known previously to provider    Telemedicine Visit: The patient's condition can be safely assessed and treated via synchronous audio and visual telemedicine encounter.      Reason for Telemedicine Visit: Patient has requested telehealth visit    Originating Site (Patient Location): Patient's home    Distant Site (Provider Location): Provider Remote Setting- Home Office    Consent:  The patient/guardian has verbally consented to: the potential risks and benefits of telemedicine (video visit) versus in person care; bill my insurance or make self-payment for services provided; and responsibility for payment of non-covered services.     Patient would like the video invitation sent by:  My Chart    Mode of Communication:  Video Conference via [employer]    Distant Location (Provider):  Off-site    As the provider I attest to compliance with applicable laws and regulations related to telemedicine.    DATA Extended Session (53+ minutes):   - Patient's presenting concerns require more intensive intervention than could be completed within the usual service    Interactive Complexity: No  Crisis: No        Progress Since Last Session (Related to Symptoms / Goals / Homework):   Symptoms: Improving      Homework: Achieved / completed to satisfaction  Radical acceptance practice      Episode of Care Goals: Minimal progress - PREPARATION (Decided to change - considering how); Intervened by negotiating a change plan and determining options / strategies for behavior change, identifying triggers, exploring social  "supports, and working towards behavior change     Current / Ongoing Stressors and Concerns:     Patient and provider met for an individual therapy session today. Patient reviewed her homework practices of radical acceptance and processed the book cristina search for meaning. Patient continues to report experiencing less anxiety than the previous weeks. Patient and provider explored concepts related to values, purpose and applied these concepts to difficult social interactions. Patient reports the the following values are important to her: Compassion, integrity,  justice, helping and contributing, putting good back in the world, intelligence and principles of \"live and let live\" and  \"Infinite diversity and infinite combination.\"    Treatment Objective(s) Addressed in This Session:   use at least 2 coping skills for anxiety management in the next  week     Intervention:   Act: Values, mindfulness and self-as-context     Assessments completed prior to visit:  The following assessments were completed by patient for this visit:  PHQ9:       2/26/2025    11:27 AM 3/3/2025     7:18 AM   PHQ-9 SCORE   PHQ-9 Total Score MyChart 1 (Minimal depression) 1 (Minimal depression)   PHQ-9 Total Score 1  1        Patient-reported     GAD7:       2/25/2025     2:38 PM 3/22/2025     7:43 AM   RICKY-7 SCORE   Total Score 5 (mild anxiety) 3 (minimal anxiety)   Total Score 5  3        Patient-reported     CAGE-AID:       3/3/2025     7:30 AM   CAGE-AID Total Score   Total Score 0    Total Score MyChart 0 (A total score of 2 or greater is considered clinically significant)       Patient-reported     PROMIS 10-Global Health (all questions and answers displayed):       3/3/2025     7:29 AM   PROMIS 10   In general, would you say your health is: Very good   In general, would you say your quality of life is: Very good   In general, how would you rate your physical health? Very good   In general, how would you rate your mental health, including your " mood and your ability to think? Good   In general, how would you rate your satisfaction with your social activities and relationships? Good   In general, please rate how well you carry out your usual social activities and roles Very good   To what extent are you able to carry out your everyday physical activities such as walking, climbing stairs, carrying groceries, or moving a chair? Completely   In the past 7 days, how often have you been bothered by emotional problems such as feeling anxious, depressed, or irritable? Often   In the past 7 days, how would you rate your fatigue on average? Mild   In the past 7 days, how would you rate your pain on average, where 0 means no pain, and 10 means worst imaginable pain? 1   In general, would you say your health is: 4   In general, would you say your quality of life is: 4   In general, how would you rate your physical health? 4   In general, how would you rate your mental health, including your mood and your ability to think? 3   In general, how would you rate your satisfaction with your social activities and relationships? 3   In general, please rate how well you carry out your usual social activities and roles. (This includes activities at home, at work and in your community, and responsibilities as a parent, child, spouse, employee, friend, etc.) 4   To what extent are you able to carry out your everyday physical activities such as walking, climbing stairs, carrying groceries, or moving a chair? 5   In the past 7 days, how often have you been bothered by emotional problems such as feeling anxious, depressed, or irritable? 4   In the past 7 days, how would you rate your fatigue on average? 2   In the past 7 days, how would you rate your pain on average, where 0 means no pain, and 10 means worst imaginable pain? 1   Global Mental Health Score 12    Global Physical Health Score 17    PROMIS TOTAL - SUBSCORES 29        Patient-reported     [place] Suicide Severity Rating  Scale (Lifetime/Recent)      3/3/2025    12:51 PM   [place] Suicide Severity Rating (Lifetime/Recent)   1. Wish to be Dead (Lifetime) N   2. Non-Specific Active Suicidal Thoughts (Lifetime) N   Actual Attempt (Lifetime) N   Has subject engaged in non-suicidal self-injurious behavior? (Lifetime) N   Interrupted Attempts (Lifetime) N   Aborted or Self-Interrupted Attempt (Lifetime) N   Preparatory Acts or Behavior (Lifetime) N   Calculated C-SSRS Risk Score (Lifetime/Recent) No Risk Indicated         ASSESSMENT: Current Emotional / Mental Status (status of significant symptoms):   Risk status (Self / Other harm or suicidal ideation)   Patient denies current fears or concerns for personal safety.   Patient denies current or recent suicidal ideation or behaviors.   Patient denies current or recent homicidal ideation or behaviors.   Patient denies current or recent self injurious behavior or ideation.   Patient denies other safety concerns.   Patient reports there has been no change in risk factors since their last session.     Patient reports there has been no change in protective factors since their last session.     Recommended that patient call 911 or go to the local ED should there be a change in any of these risk factors     Appearance:   Appropriate    Eye Contact:   Fair    Psychomotor Behavior: Restless    Attitude:   Friendly Attentive   Orientation:   All   Speech    Rate / Production: Normal     Volume:  Normal    Mood:    Anxious    Affect:    Appropriate    Thought Content:  Clear    Thought Form:  Coherent    Insight:    Good      Medication Review:   No changes to current psychiatric medication(s)     Medication Compliance:   Yes     Changes in Health Issues:   None reported     Chemical Use Review:   Substance Use: Chemical use reviewed, no active concerns identified      Tobacco Use: No current tobacco use.      Diagnosis:  1. Adjustment disorder with anxiety          Collateral Reports  Completed:   Not Applicable    PLAN: (Patient Tasks / Therapist Tasks / Other)  Keep values in the front and center of day to day living.  Practice acting in flexible ways that align with values.      Suze Goff MA [facility]                              ________________________________________________________________  Individual Treatment Plan     Patient's Name: Lennie NÚÑEZ                       YOB: 1970     Date of Creation: 3/13/2025  Date Treatment Plan Last Reviewed/Revised: 3/13/2025     DSM5 Diagnoses: Adjustment Disorders  309.24 (F43.22) With anxiety  Psychosocial / Contextual Factors: Psychosocial Factors:  Medical complexities, Grief and loss and impact to [facility].  Cultural and Contextual Factors: Aging mother who lives alone, the passing of two cats last year, political and economic climate, and self-employed.   PROMIS (reviewed every 90 days):       3/3/2025     7:29 AM   PROMIS 10   In general, would you say your health is: Very good   In general, would you say your quality of life is: Very good   In general, how would you rate your physical health? Very good   In general, how would you rate your mental health, including your mood and your ability to think? Good   In general, how would you rate your satisfaction with your social activities and relationships? Good   In general, please rate how well you carry out your usual social activities and roles Very good   To what extent are you able to carry out your everyday physical activities such as walking, climbing stairs, carrying groceries, or moving a chair? Completely   In the past 7 days, how often have you been bothered by emotional problems such as feeling anxious, depressed, or irritable? Often   In the past 7 days, how would you rate your fatigue on average? Mild   In the past 7 days, how would you rate your pain on average, where 0 means no pain, and 10 means worst imaginable pain? 1   In general, would  "you say your health is: 4   In general, would you say your quality of life is: 4   In general, how would you rate your physical health? 4   In general, how would you rate your mental health, including your mood and your ability to think? 3   In general, how would you rate your satisfaction with your social activities and relationships? 3   In general, please rate how well you carry out your usual social activities and roles. (This includes activities at home, at work and in your community, and responsibilities as a parent, child, spouse, employee, friend, etc.) 4   To what extent are you able to carry out your everyday physical activities such as walking, climbing stairs, carrying groceries, or moving a chair? 5   In the past 7 days, how often have you been bothered by emotional problems such as feeling anxious, depressed, or irritable? 4   In the past 7 days, how would you rate your fatigue on average? 2   In the past 7 days, how would you rate your pain on average, where 0 means no pain, and 10 means worst imaginable pain? 1   Global Mental Health Score 12    Global Physical Health Score 17    PROMIS TOTAL - SUBSCORES 29          Referral / Collaboration:  Referral to another professional/service is not indicated at this time..     Anticipated number of session for this episode of care: 9-12 sessions  Anticipation frequency of session: Weekly  Anticipated Duration of each session: 38-52 minutes  Treatment plan will be reviewed in 90 days or when goals have been changed.         MeasurableTreatment Goal(s) related to diagnosis / functional impairment(s)  Goal 1: Patient will reduce her Vito-7 score by at least 2 points.    I will know I've met my goal when recognize I have thoughts and feelings related to anxiety and not have them hurt me.\"     Objective #A (Patient Action)                          Patient will identify at least 3 triggers for anxiety.  Status: New - Date: 3/13/2025       Intervention(s)  Therapist " will teach  mindfulness skills.     Objective #B  Patient will attend and participate in social or recreational activities .  Status: New - Date: 3/13/2025       Intervention(s)  Therapist will provide hw assignments on behavioral activation and exposure.     Objective #C  Patient will use cognitive strategies identified in therapy to challenge anxious thoughts.  Status: New - Date: 3/13/2025       Intervention(s)  Therapist will provide processed based ACT therapy.           Patient has reviewed and agreed to the above plan.      Suze Goff MA [facility]  May 30, 2025

## 2025-05-06 ENCOUNTER — E-VISIT (OUTPATIENT)
Dept: INTERNAL MEDICINE | Facility: CLINIC | Age: 55
End: 2025-05-06
Payer: COMMERCIAL

## 2025-05-06 DIAGNOSIS — J06.9 VIRAL URI: Primary | ICD-10-CM

## 2025-05-06 NOTE — PATIENT INSTRUCTIONS
Symptoms may represent sinus infection, but at this point, it's most likely viral and/or allergic rather than bacterial.  Typical pattern for viral URI is that symptoms peak between days 3-5 and then gradually improve after that. Symptoms can last for 7-10 days. Generally, you do not need an antibiotic unless symptoms persist longer than 7-10 days. Given recent antibiotics a couple weeks ago, it is safer to treat as a viral or allergic recurrence rather than go right to antibiotics given symptom presentation.    CONTINUE supportive cares:  - Tylenol or ibuprofen, as needed, for sore throat and body aches related to cough. Take as directed on packaging. Do not exceed 2400 mg ibuprofen or 3000 mg Tylenol from any source in any 24-hr period. Always take ibuprofen with food. Avoid drinking alcohol when taking Tylenol or ibuprofen.  - Warm, salt water gargles 3-4 times a day for sore throat (1/2 tsp salt in 1 cup of warm water).  - Lozenges for throat pain and cough.  - Cold foods and beverages such as ice cubes, popsicles, and ice cream, may help with sore throat.  - Increase your fluid intake. Warm soups, warm tea, and/or hot water with lemon and honey may help with throat pain and be soothing for cough.   - Rest as needed.  - Cetirizine (Zyrtec) 10 mg daily for runny nose and congestion; can help with allergic symptoms as well.  - Mucinex 600 mg twice daily and Sudafed as needed over next 1-2 weeks can help with runny nose and nasal congestion.    Follow up if symptoms worsen or persist.    Viral Respiratory Infection: Care Instructions  Overview     A viral respiratory infection is an infection of the nose, sinuses, or throat caused by a virus. Colds and the flu are common types of viral respiratory infections.  The symptoms of a viral respiratory infection often start quickly. They include a fever, sore throat, and runny nose. You may also just not feel well. Or you may not want to eat much.  Most viral infections  can be treated with home care. This may include drinking lots of fluids and taking over-the-counter pain medicine. You will probably feel better in 4 to 10 days.  Antibiotics are not used to treat a viral infection. Antibiotics don't kill viruses, so they won't help cure a viral illness.  In some cases, a doctor may prescribe antiviral medicine to help your body fight a serious viral infection.  Follow-up care is a key part of your treatment and safety. Be sure to make and go to all appointments, and call your doctor if you are having problems. It's also a good idea to know your test results and keep a list of the medicines you take.  How can you care for yourself at home?  To prevent dehydration, drink plenty of fluids. Choose water and other clear liquids until you feel better. If you have kidney, heart, or liver disease and have to limit fluids, talk with your doctor before you increase the amount of fluids you drink.  Ask your doctor if you can take an over-the-counter pain medicine, such as acetaminophen (Tylenol), ibuprofen (Advil, Motrin), or naproxen (Aleve). Be safe with medicines. Read and follow all instructions on the label. No one younger than 20 should take aspirin. It has been linked to Reye syndrome, a serious illness.  Be careful when taking over-the-counter cold or flu medicines and Tylenol at the same time. Many of these medicines have acetaminophen, which is Tylenol. Read the labels to make sure that you are not taking more than the recommended dose. Too much acetaminophen (Tylenol) can be harmful.  Get plenty of rest.  Use saline (saltwater) nasal washes to help keep your nasal passages open and wash out mucus and allergens. You can buy saline nose sprays at a grocery store or drugstore. Follow the instructions on the package. Or you can make your own at home. Add 1 teaspoon of non-iodized salt and 1 teaspoon of baking soda to 2 cups of distilled or boiled and cooled water. Fill a squeeze bottle  "or neti pot with the nasal wash. Then put the tip into your nostril, and lean over the sink. With your mouth open, gently squirt the liquid. Repeat on the other side.  Use a vaporizer or humidifier to add moisture to your bedroom. Follow the instructions for cleaning the machine.  Do not smoke or allow others to smoke around you. If you need help quitting, talk to your doctor about stop-smoking programs and medicines. These can increase your chances of quitting for good.  When should you call for help?   Call 911 anytime you think you may need emergency care. For example, call if:    You have severe trouble breathing.   Call your doctor now or seek immediate medical care if:    You have a new or higher fever.     Your fever lasts more than 48 hours.     You have trouble breathing.     You have a fever with a stiff neck or a severe headache.     You are sensitive to light.     You feel very sleepy or confused.   Watch closely for changes in your health, and be sure to contact your doctor if:    You do not get better as expected.   Where can you learn more?  Go to https://www.[employer].net/patiented  Enter Q795 in the search box to learn more about \"Viral Respiratory Infection: Care Instructions.\"  Current as of: April 30, 2024  Content Version: 14.4    2869-1000 [employer].   Care instructions adapted under license by your healthcare professional. If you have questions about a medical condition or this instruction, always ask your healthcare professional. [employer] disclaims any warranty or liability for your use of this information.     The symptoms you describe suggest a viral cause, which is much more common than a bacterial cause. Antibiotics will treat bacterial infections, but have no effect on viral infections. If possible, especially if improving, start with symptom care for the first 7-10 days, then consider seeking further treatment or taking an antibiotic. Bacterial infections " generally are more severe, including symptoms such as pus, fever over 101degrees F, or rapidly worsening.

## 2025-05-06 NOTE — TELEPHONE ENCOUNTER
On-going sinus symptoms since Dec 2024.   Has been treated with at least 2 rounds of antibiotics, most recently 4/15/25.  Sinus CT largely unremarkable 4/4/25.  ENT appointment is scheduled for July; is on the cancellation list.    Supportive cares recommended: fluids, rest, OTC symptom reducers. Discussed indications for antibiotics and general principles of infection prevention. See AVS for details.  Suspect some degree of allergic component;   Follow up with worsening or persistent symptoms.     Provider E-Visit time total (minutes): 8 minutes

## 2025-05-07 NOTE — PROGRESS NOTES
SUBJECTIVE:     I spent a total of 15 minutes on the care of Lennie on the day of service including 5 minutes of face-to-face time with remainder in chart review, care coordination, documentation on the day of service.  Additional time was spent in the removal of the cervical polyp and evaluation and discussion of left labial hypertrophy patient is a 54-year-old P0 who presents                                                  Lennie NÚÑEZ  is a 54 year old female who presents to clinic today for the following health issue(s):  CERVICAL POLYP  No chief complaint on file.      Additional information: CERVICAL POLYP    From her primary care with concern regarding a cervical polyp.  We talked about the nature of polyps, how they removed, and the concern for potential pathology.  At the end also discussed that this polyp likely had a fairly generous stalk and she should be seen again in approximately a month to reevaluate that the entire polyp and its stalk was removed.    Examination:  Patient is pleasant and appropriate in no apparent distress  External genitalia are normal however the left labia is significantly hypertrophied compared to the right  Vaginal mucosa is normal  Cervix has a prominent polyp.    Patient was consented for removal of cervical polyp and agreed  The polyp was grasped with a  ring forceps.  To the twisting maneuver it was  from its base.  There is a bleeding that followed and what appeared to be potentially a polyp stalk that was present at the cervical os that previously was not viewed.  It was bleeding and silver nitrate was applied to it good hemostasis had.    We talked about labial hypertrophy.  She is more concerned that this might be a problem that is more than just anatomical.  And at this point nothing more needs to be done and she is content with that.    Assessment is cervical polyp.    Plan suggest that she return in a month's time and reevaluate the foot may or may  not be a polyp stalk that is still present.  See above      No LMP recorded. Patient is perimenopausal..     Patient is not sexually active, .  Using none for contraception.    reports that she has never smoked. She has never used smokeless tobacco.    STD testing offered?  Declined    Health maintenance updated:  no    Today's PHQ-2 Score:       2025     3:42 PM   PHQ-2 (  [employer])   Q1: Little interest or pleasure in doing things 0   Q2: Feeling down, depressed or hopeless 0   PHQ-2 Score 0    Q1: Little interest or pleasure in doing things Not at all   Q2: Feeling down, depressed or hopeless Not at all   PHQ-2 Score 0       Patient-reported     Today's PHQ-9 Score:       3/3/2025     7:18 AM   PHQ-9 SCORE   PHQ-9 Total Score MyChart 1 (Minimal depression)   PHQ-9 Total Score 1        Patient-reported     Today's RICKY-7 Score:       3/22/2025     7:43 AM   RICKY-7 SCORE   Total Score 3 (minimal anxiety)   Total Score 3        Patient-reported       Problem list and histories reviewed & adjusted, as indicated.  Additional history: as documented.    Patient Active Problem List   Diagnosis    Menopausal syndrome (hot flashes)    Elevated blood pressure reading without diagnosis of hypertension    Anxiety     Past Surgical History:   Procedure Laterality Date    AS RAD RESEC TONSIL/PILLARS      age 9    HAND SURGERY Left       Social History     Tobacco Use    Smoking status: Never    Smokeless tobacco: Never   Substance Use Topics    Alcohol use: Yes     Alcohol/week: 1.0 standard drink of alcohol     Types: 1 Cans of beer per week     Comment: 1 beer per week      Problem (# of Occurrences) Relation (Name,Age of Onset)    Osteoporosis (1) Mother (Lottie )    Cerebrovascular Disease (1) Maternal Grandmother (Padmini Carroen)    Thyroid Disease (1) Mother (Lottie )    Hyperlipidemia (1) Mother (Lottie )    Coronary Artery Disease (2) Father (Ulisses STANTON ), Paternal  Grandfather (Sudheer SOMMER )    Osteoarthritis (1) Mother (Lottie ): hip replacement    Pancreatic Cancer (1) Maternal Grandfather              Current Outpatient Medications   Medication Sig Dispense Refill    calcium carbonate (OS-ANGEL 500 MG [place]. CA) 1250 MG tablet Take 2 tablets by mouth 2 times daily      estradiol (ESTRACE) 1 MG tablet Take 1 tablet (1 mg) by mouth daily. 90 tablet 1    hydrOXYzine HCl (ATARAX) 10 MG tablet Take 1-2 tablets (10-20 mg) by mouth 3 times daily as needed for anxiety. 60 tablet 2    Multiple Vitamin (MULTI-VITAMINS) TABS Take by mouth.      progesterone (PROMETRIUM) 100 MG capsule Take 1 capsule (100 mg) by mouth daily. In the evening 90 capsule 1     No current facility-administered medications for this visit.     No Known Allergies      OBJECTIVE:     There were no vitals taken for this visit.  There is no height or weight on file to calculate BMI.    Exam:  See above    In-Clinic Test Results:  No results found for this or any previous visit (from the past 24 hours).    ASSESSMENT/PLAN:                                                      See above    Wilson Currie MD  [facility]

## 2025-05-08 ENCOUNTER — OFFICE VISIT (OUTPATIENT)
Dept: OBGYN | Facility: CLINIC | Age: 55
End: 2025-05-08
Attending: NURSE PRACTITIONER
Payer: COMMERCIAL

## 2025-05-08 VITALS
BODY MASS INDEX: 22.5 KG/M2 | SYSTOLIC BLOOD PRESSURE: 126 MMHG | DIASTOLIC BLOOD PRESSURE: 64 MMHG | WEIGHT: 140 LBS | HEIGHT: 66 IN

## 2025-05-08 DIAGNOSIS — N84.1 CERVICAL POLYP: ICD-10-CM

## 2025-05-08 NOTE — NURSING NOTE
"No chief complaint on file.      Initial /64   Ht 1.676 m (5' 6\")   Wt 63.5 kg (140 lb)   BMI 22.60 kg/m   Estimated body mass index is 22.6 kg/m  as calculated from the following:    Height as of this encounter: 1.676 m (5' 6\").    Weight as of this encounter: 63.5 kg (140 lb).  BP completed using cuff size: regular    Questioned patient about current smoking habits.  Pt. has never smoked.          The following HM Due: NONE    Velvet Li LPN on 2025 at 7:49 AM           "

## 2025-05-11 ENCOUNTER — MYC REFILL (OUTPATIENT)
Dept: INTERNAL MEDICINE | Facility: CLINIC | Age: 55
End: 2025-05-11
Payer: COMMERCIAL

## 2025-05-11 DIAGNOSIS — F41.9 ANXIETY: ICD-10-CM

## 2025-05-12 LAB
PATH REPORT.COMMENTS IMP SPEC: NORMAL
PATH REPORT.COMMENTS IMP SPEC: NORMAL
PATH REPORT.FINAL DX SPEC: NORMAL
PATH REPORT.GROSS SPEC: NORMAL
PATH REPORT.MICROSCOPIC SPEC OTHER STN: NORMAL
PATH REPORT.RELEVANT HX SPEC: NORMAL
PHOTO IMAGE: NORMAL

## 2025-05-12 RX ORDER — HYDROXYZINE HYDROCHLORIDE 10 MG/1
10-20 TABLET, FILM COATED ORAL 3 TIMES DAILY PRN
Qty: 60 TABLET | Refills: 2 | Status: SHIPPED | OUTPATIENT
Start: 2025-05-12

## 2025-05-15 ENCOUNTER — VIRTUAL VISIT (OUTPATIENT)
Dept: PSYCHOLOGY | Facility: CLINIC | Age: 55
End: 2025-05-15
Payer: COMMERCIAL

## 2025-05-15 ENCOUNTER — RESULTS FOLLOW-UP (OUTPATIENT)
Dept: OBGYN | Facility: CLINIC | Age: 55
End: 2025-05-15

## 2025-05-15 DIAGNOSIS — F43.22 ADJUSTMENT DISORDER WITH ANXIETY: Primary | ICD-10-CM

## 2025-05-15 PROCEDURE — 90837 PSYTX W PT 60 MINUTES: CPT | Mod: 95 | Performed by: PSYCHOLOGIST

## 2025-05-15 NOTE — PROGRESS NOTES
[facility]                                     Progress Note    Patient Name: Lennie More  Date: 5/15/2025         Service Type: Individual      Session Start Time: 03:03  Session End Time: 04:00     Session Length: 56 minutes    Session #: 10    Attendees: Client    Service Modality:  Video Visit:      Provider verified identity through the following two step process.  Patient provided:  Patient is known previously to provider    Telemedicine Visit: The patient's condition can be safely assessed and treated via synchronous audio and visual telemedicine encounter.      Reason for Telemedicine Visit: Patient has requested telehealth visit    Originating Site (Patient Location): Patient's home    Distant Site (Provider Location): Provider Remote Setting- Home Office    Consent:  The patient/guardian has verbally consented to: the potential risks and benefits of telemedicine (video visit) versus in person care; bill my insurance or make self-payment for services provided; and responsibility for payment of non-covered services.     Patient would like the video invitation sent by:  My Chart    Mode of Communication:  Video Conference via [employer]well    Distant Location (Provider):  Off-site    As the provider I attest to compliance with applicable laws and regulations related to telemedicine.    DATA Extended Session (53+ minutes):   - Patient's presenting concerns require more intensive intervention than could be completed within the usual service    Interactive Complexity: No  Crisis: No        Progress Since Last Session (Related to Symptoms / Goals / Homework):   Symptoms: Worsening      Homework: Partially completed       Episode of Care Goals: Satisfactory progress - PREPARATION (Decided to change - considering how); Intervened by negotiating a change plan and determining options / strategies for behavior change, identifying triggers, exploring social supports, and working towards setting a  "date to begin behavior change       Current / Ongoing Stressors and Concerns:     Patient and provider met for an individual therapy session today. Patient reports having an increase in health anxiety. Patient and provider discussed over vs under control. Patient reports trying to practice acceptance skills. Patient and provider discussed strategies for practicing acceptance, Patient reports the the following values are important to her: Compassion, integrity,  justice, helping and contributing, putting good back in the world, intelligence and principles of \"live and let live\" and  \"Infinite diversity and infinite combination.\"      Treatment Objective(s) Addressed in This Session:   use at least 2 coping skills for anxiety management in the next  week     Intervention:   Act: Values, mindfulness and acceptance.    Assessments completed prior to visit:  The following assessments were completed by patient for this visit:  PHQ9:       2/26/2025    11:27 AM 3/3/2025     7:18 AM   PHQ-9 SCORE   PHQ-9 Total Score MyChart 1 (Minimal depression) 1 (Minimal depression)   PHQ-9 Total Score 1  1        Patient-reported     GAD7:       2/25/2025     2:38 PM 3/22/2025     7:43 AM   RICKY-7 SCORE   Total Score 5 (mild anxiety) 3 (minimal anxiety)   Total Score 5  3        Patient-reported     CAGE-AID:       3/3/2025     7:30 AM   CAGE-AID Total Score   Total Score 0    Total Score MyChart 0 (A total score of 2 or greater is considered clinically significant)       Patient-reported     PROMIS 10-Global Health (all questions and answers displayed):       3/3/2025     7:29 AM   PROMIS 10   In general, would you say your health is: Very good   In general, would you say your quality of life is: Very good   In general, how would you rate your physical health? Very good   In general, how would you rate your mental health, including your mood and your ability to think? Good   In general, how would you rate your satisfaction with your " social activities and relationships? Good   In general, please rate how well you carry out your usual social activities and roles Very good   To what extent are you able to carry out your everyday physical activities such as walking, climbing stairs, carrying groceries, or moving a chair? Completely   In the past 7 days, how often have you been bothered by emotional problems such as feeling anxious, depressed, or irritable? Often   In the past 7 days, how would you rate your fatigue on average? Mild   In the past 7 days, how would you rate your pain on average, where 0 means no pain, and 10 means worst imaginable pain? 1   In general, would you say your health is: 4   In general, would you say your quality of life is: 4   In general, how would you rate your physical health? 4   In general, how would you rate your mental health, including your mood and your ability to think? 3   In general, how would you rate your satisfaction with your social activities and relationships? 3   In general, please rate how well you carry out your usual social activities and roles. (This includes activities at home, at work and in your community, and responsibilities as a parent, child, spouse, employee, friend, etc.) 4   To what extent are you able to carry out your everyday physical activities such as walking, climbing stairs, carrying groceries, or moving a chair? 5   In the past 7 days, how often have you been bothered by emotional problems such as feeling anxious, depressed, or irritable? 4   In the past 7 days, how would you rate your fatigue on average? 2   In the past 7 days, how would you rate your pain on average, where 0 means no pain, and 10 means worst imaginable pain? 1   Global Mental Health Score 12    Global Physical Health Score 17    PROMIS TOTAL - SUBSCORES 29        Patient-reported     [place] Suicide Severity Rating Scale (Lifetime/Recent)      3/3/2025    12:51 PM   [place] Suicide Severity Rating  (Lifetime/Recent)   1. Wish to be Dead (Lifetime) N   2. Non-Specific Active Suicidal Thoughts (Lifetime) N   Actual Attempt (Lifetime) N   Has subject engaged in non-suicidal self-injurious behavior? (Lifetime) N   Interrupted Attempts (Lifetime) N   Aborted or Self-Interrupted Attempt (Lifetime) N   Preparatory Acts or Behavior (Lifetime) N   Calculated C-SSRS Risk Score (Lifetime/Recent) No Risk Indicated         ASSESSMENT: Current Emotional / Mental Status (status of significant symptoms):   Risk status (Self / Other harm or suicidal ideation)   Patient denies current fears or concerns for personal safety.   Patient denies current or recent suicidal ideation or behaviors.   Patient denies current or recent homicidal ideation or behaviors.   Patient denies current or recent self injurious behavior or ideation.   Patient denies other safety concerns.   Patient reports there has been no change in risk factors since their last session.     Patient reports there has been no change in protective factors since their last session.     Recommended that patient call 911 or go to the local ED should there be a change in any of these risk factors     Appearance:   Appropriate    Eye Contact:   Good    Psychomotor Behavior: Normal    Attitude:   Attentive   Orientation:   All   Speech    Rate / Production: Emotional    Volume:  Normal    Mood:    Anxious    Affect:    Constricted    Thought Content:  Clear    Thought Form:  Coherent    Insight:    Fair      Medication Review:   No changes to current psychiatric medication(s)     Medication Compliance:   Yes     Changes in Health Issues:   None reported     Chemical Use Review:   Substance Use: Chemical use reviewed, no active concerns identified      Tobacco Use: No current tobacco use.      Diagnosis:  1. Adjustment disorder with anxiety      Collateral Reports Completed:   Not Applicable    PLAN: (Patient Tasks / Therapist Tasks / Other)  Practice acceptance and letting  go  Share journal at the start of next session      Szue Goff MA [facility]                              ________________________________________________________________  Individual Treatment Plan     Patient's Name: Lennie NÚÑEZ                       YOB: 1970     Date of Creation: 3/13/2025  Date Treatment Plan Last Reviewed/Revised: 3/13/2025     DSM5 Diagnoses: Adjustment Disorders  309.24 (F43.22) With anxiety  Psychosocial / Contextual Factors: Psychosocial Factors:  Medical complexities, Grief and loss and impact to [facility].  Cultural and Contextual Factors: Aging mother who lives alone, the passing of two cats last year, political and economic climate, and self-employed.   PROMIS (reviewed every 90 days):       3/3/2025     7:29 AM   PROMIS 10   In general, would you say your health is: Very good   In general, would you say your quality of life is: Very good   In general, how would you rate your physical health? Very good   In general, how would you rate your mental health, including your mood and your ability to think? Good   In general, how would you rate your satisfaction with your social activities and relationships? Good   In general, please rate how well you carry out your usual social activities and roles Very good   To what extent are you able to carry out your everyday physical activities such as walking, climbing stairs, carrying groceries, or moving a chair? Completely   In the past 7 days, how often have you been bothered by emotional problems such as feeling anxious, depressed, or irritable? Often   In the past 7 days, how would you rate your fatigue on average? Mild   In the past 7 days, how would you rate your pain on average, where 0 means no pain, and 10 means worst imaginable pain? 1   In general, would you say your health is: 4   In general, would you say your quality of life is: 4   In general, how would you rate your physical health? 4   In general,  "how would you rate your mental health, including your mood and your ability to think? 3   In general, how would you rate your satisfaction with your social activities and relationships? 3   In general, please rate how well you carry out your usual social activities and roles. (This includes activities at home, at work and in your community, and responsibilities as a parent, child, spouse, employee, friend, etc.) 4   To what extent are you able to carry out your everyday physical activities such as walking, climbing stairs, carrying groceries, or moving a chair? 5   In the past 7 days, how often have you been bothered by emotional problems such as feeling anxious, depressed, or irritable? 4   In the past 7 days, how would you rate your fatigue on average? 2   In the past 7 days, how would you rate your pain on average, where 0 means no pain, and 10 means worst imaginable pain? 1   Global Mental Health Score 12    Global Physical Health Score 17    PROMIS TOTAL - SUBSCORES 29          Referral / Collaboration:  Referral to another professional/service is not indicated at this time.     Anticipated number of session for this episode of care: 9-12 sessions  Anticipation frequency of session: Weekly  Anticipated Duration of each session: 38-52 minutes  Treatment plan will be reviewed in 90 days or when goals have been changed.         MeasurableTreatment Goal(s) related to diagnosis / functional impairment(s)  Goal 1: Patient will reduce her Vito-7 score by at least 2 points.    I will know I've met my goal when recognize I have thoughts and feelings related to anxiety and not have them hurt me.\"     Objective #A (Patient Action)                          Patient will identify at least 3 triggers for anxiety.  Status: New - Date: 3/13/2025       Intervention(s)  Therapist will teach  mindfulness skills.     Objective #B  Patient will attend and participate in social or recreational activities .  Status: New - Date: " 3/13/2025       Intervention(s)  Therapist will provide hw assignments on behavioral activation and exposure.     Objective #C  Patient will use cognitive strategies identified in therapy to challenge anxious thoughts.  Status: New - Date: 3/13/2025       Intervention(s)  Therapist will provide processed based ACT therapy.           Patient has reviewed and agreed to the above plan.      Suze Goff MA [facility]  May  15, 2025

## 2025-05-22 ENCOUNTER — VIRTUAL VISIT (OUTPATIENT)
Dept: PSYCHOLOGY | Facility: CLINIC | Age: 55
End: 2025-05-22
Payer: COMMERCIAL

## 2025-05-22 DIAGNOSIS — F43.22 ADJUSTMENT DISORDER WITH ANXIETY: Primary | ICD-10-CM

## 2025-05-22 ASSESSMENT — ANXIETY QUESTIONNAIRES
IF YOU CHECKED OFF ANY PROBLEMS ON THIS QUESTIONNAIRE, HOW DIFFICULT HAVE THESE PROBLEMS MADE IT FOR YOU TO DO YOUR WORK, TAKE CARE OF THINGS AT HOME, OR GET ALONG WITH OTHER PEOPLE: NOT DIFFICULT AT ALL
5. BEING SO RESTLESS THAT IT IS HARD TO SIT STILL: NOT AT ALL
1. FEELING NERVOUS, ANXIOUS, OR ON EDGE: NOT AT ALL
6. BECOMING EASILY ANNOYED OR IRRITABLE: NOT AT ALL
2. NOT BEING ABLE TO STOP OR CONTROL WORRYING: NOT AT ALL
3. WORRYING TOO MUCH ABOUT DIFFERENT THINGS: NOT AT ALL
7. FEELING AFRAID AS IF SOMETHING AWFUL MIGHT HAPPEN: NOT AT ALL
GAD7 TOTAL SCORE: 0
GAD7 TOTAL SCORE: 0

## 2025-05-22 ASSESSMENT — PATIENT HEALTH QUESTIONNAIRE - PHQ9
5. POOR APPETITE OR OVEREATING: NOT AT ALL
SUM OF ALL RESPONSES TO PHQ QUESTIONS 1-9: 0

## 2025-05-22 NOTE — PROGRESS NOTES
[facility]                                     Progress Note    Patient Name: Lennie More  Date: 5/22/2025         Service Type: Individual      Session Start Time: 03:02  Session End Time: 03:54     Session Length: 52 minutes    Session #: 11    Attendees: Client    Service Modality:  Video Visit:      Provider verified identity through the following two step process.  Patient provided:  Patient is known previously to provider    Telemedicine Visit: The patient's condition can be safely assessed and treated via synchronous audio and visual telemedicine encounter.      Reason for Telemedicine Visit: Patient has requested telehealth visit    Originating Site (Patient Location): Patient's home    Distant Site (Provider Location): Provider Remote Setting- Home Office    Consent:  The patient/guardian has verbally consented to: the potential risks and benefits of telemedicine (video visit) versus in person care; bill my insurance or make self-payment for services provided; and responsibility for payment of non-covered services.     Patient would like the video invitation sent by:  My Chart    Mode of Communication:  Video Conference via Amwell    Distant Location (Provider):  Off-site    As the provider I attest to compliance with applicable laws and regulations related to telemedicine.    DATA   Interactive Complexity: No  Crisis: No        Progress Since Last Session (Related to Symptoms / Goals / Homework):   Symptoms: Improving      Homework: Achieved / completed to satisfaction       Episode of Care Goals: Satisfactory progress - ACTION (Actively working towards change); Intervened by reinforcing change plan / affirming steps taken       Current / Ongoing Stressors and Concerns:     Patient and provider met for an individual therapy session today. Patient processed her journal refections. She noticed less anxiety and noted many examples of activities that align with what matters to her.  "Patient discussed acceptance strategies she has been using and her upcoming trip to help her mom during a hip replacement surgery. Patient and provider reviewed her treatment plan goals and progress. She made a plan with this provider that she will get in touch after returning from her mothers about continuing therapy. Patient reports the the following values are important to her: Compassion, integrity,  justice, helping and contributing, putting good back in the world, intelligence and principles of \"live and let live\" and  \"Infinite diversity and infinite combination.\"      Treatment Objective(s) Addressed in This Session:   use at least 2 coping skills for anxiety management in the next  week     Intervention:   Act: Values, mindfulness and acceptance.    Assessments completed prior to visit:  The following assessments were completed by patient for this visit:  PHQ9:       2/26/2025    11:27 AM 3/3/2025     7:18 AM 5/22/2025     3:27 PM   PHQ-9 SCORE   PHQ-9 Total Score MyChart 1 (Minimal depression) 1 (Minimal depression)    PHQ-9 Total Score 1  1  0       Patient-reported     GAD7:       2/25/2025     2:38 PM 3/22/2025     7:43 AM 5/22/2025     3:27 PM   RICKY-7 SCORE   Total Score 5 (mild anxiety) 3 (minimal anxiety)    Total Score 5  3  0       Patient-reported     CAGE-AID:       3/3/2025     7:30 AM   CAGE-AID Total Score   Total Score 0    Total Score MyChart 0 (A total score of 2 or greater is considered clinically significant)       Patient-reported     PROMIS 10-Global Health (all questions and answers displayed):       3/3/2025     7:29 AM   PROMIS 10   In general, would you say your health is: Very good   In general, would you say your quality of life is: Very good   In general, how would you rate your physical health? Very good   In general, how would you rate your mental health, including your mood and your ability to think? Good   In general, how would you rate your satisfaction with your social " activities and relationships? Good   In general, please rate how well you carry out your usual social activities and roles Very good   To what extent are you able to carry out your everyday physical activities such as walking, climbing stairs, carrying groceries, or moving a chair? Completely   In the past 7 days, how often have you been bothered by emotional problems such as feeling anxious, depressed, or irritable? Often   In the past 7 days, how would you rate your fatigue on average? Mild   In the past 7 days, how would you rate your pain on average, where 0 means no pain, and 10 means worst imaginable pain? 1   In general, would you say your health is: 4   In general, would you say your quality of life is: 4   In general, how would you rate your physical health? 4   In general, how would you rate your mental health, including your mood and your ability to think? 3   In general, how would you rate your satisfaction with your social activities and relationships? 3   In general, please rate how well you carry out your usual social activities and roles. (This includes activities at home, at work and in your community, and responsibilities as a parent, child, spouse, employee, friend, etc.) 4   To what extent are you able to carry out your everyday physical activities such as walking, climbing stairs, carrying groceries, or moving a chair? 5   In the past 7 days, how often have you been bothered by emotional problems such as feeling anxious, depressed, or irritable? 4   In the past 7 days, how would you rate your fatigue on average? 2   In the past 7 days, how would you rate your pain on average, where 0 means no pain, and 10 means worst imaginable pain? 1   Global Mental Health Score 12    Global Physical Health Score 17    PROMIS TOTAL - SUBSCORES 29        Patient-reported     [place] Suicide Severity Rating Scale (Lifetime/Recent)      3/3/2025    12:51 PM   T[place]fant Suicide Severity Rating (Lifetime/Recent)    1. Wish to be Dead (Lifetime) N   2. Non-Specific Active Suicidal Thoughts (Lifetime) N   Actual Attempt (Lifetime) N   Has subject engaged in non-suicidal self-injurious behavior? (Lifetime) N   Interrupted Attempts (Lifetime) N   Aborted or Self-Interrupted Attempt (Lifetime) N   Preparatory Acts or Behavior (Lifetime) N   Calculated C-SSRS Risk Score (Lifetime/Recent) No Risk Indicated         ASSESSMENT: Current Emotional / Mental Status (status of significant symptoms):   Risk status (Self / Other harm or suicidal ideation)   Patient denies current fears or concerns for personal safety.   Patient denies current or recent suicidal ideation or behaviors.   Patient denies current or recent homicidal ideation or behaviors.   Patient denies current or recent self injurious behavior or ideation.   Patient denies other safety concerns.   Patient reports there has been no change in risk factors since their last session.     Patient reports there has been no change in protective factors since their last session.     Recommended that patient call 911 or go to the local ED should there be a change in any of these risk factors     Appearance:   Appropriate    Eye Contact:   Good    Psychomotor Behavior: Normal    Attitude:   Attentive   Orientation:   All   Speech    Rate / Production: Emotional    Volume:  Normal    Mood:    Anxious  Euthymic   Affect:    Bright    Thought Content:  Clear    Thought Form:  Coherent    Insight:    Good      Medication Review:   No changes to current psychiatric medication(s)     Medication Compliance:   Yes     Changes in Health Issues:   None reported     Chemical Use Review:   Substance Use: Chemical use reviewed, no active concerns identified      Tobacco Use: No current tobacco use.      Diagnosis:  1. Adjustment disorder with anxiety        Collateral Reports Completed:   Not Applicable    PLAN: (Patient Tasks / Therapist Tasks / Other)  Practice cope ahead plan  Check in after  returning from the trip      Suze Goff MA [facility]                              ________________________________________________________________  Individual Treatment Plan     Patient's Name: Lennie NÚÑEZ                       YOB: 1970     Date of Creation: 3/13/2025  Date Treatment Plan Last Reviewed/Revised: 3/13/2025     DSM5 Diagnoses: Adjustment Disorders  309.24 (F43.22) With anxiety  Psychosocial / Contextual Factors: Psychosocial Factors:  Medical complexities, Grief and loss and impact to [facility].  Cultural and Contextual Factors: Aging mother who lives alone, the passing of two cats last year, political and economic climate, and self-employed.   PROMIS (reviewed every 90 days):       3/3/2025     7:29 AM   PROMIS 10   In general, would you say your health is: Very good   In general, would you say your quality of life is: Very good   In general, how would you rate your physical health? Very good   In general, how would you rate your mental health, including your mood and your ability to think? Good   In general, how would you rate your satisfaction with your social activities and relationships? Good   In general, please rate how well you carry out your usual social activities and roles Very good   To what extent are you able to carry out your everyday physical activities such as walking, climbing stairs, carrying groceries, or moving a chair? Completely   In the past 7 days, how often have you been bothered by emotional problems such as feeling anxious, depressed, or irritable? Often   In the past 7 days, how would you rate your fatigue on average? Mild   In the past 7 days, how would you rate your pain on average, where 0 means no pain, and 10 means worst imaginable pain? 1   In general, would you say your health is: 4   In general, would you say your quality of life is: 4   In general, how would you rate your physical health? 4   In general, how would you rate  "your mental health, including your mood and your ability to think? 3   In general, how would you rate your satisfaction with your social activities and relationships? 3   In general, please rate how well you carry out your usual social activities and roles. (This includes activities at home, at work and in your community, and responsibilities as a parent, child, spouse, employee, friend, etc.) 4   To what extent are you able to carry out your everyday physical activities such as walking, climbing stairs, carrying groceries, or moving a chair? 5   In the past 7 days, how often have you been bothered by emotional problems such as feeling anxious, depressed, or irritable? 4   In the past 7 days, how would you rate your fatigue on average? 2   In the past 7 days, how would you rate your pain on average, where 0 means no pain, and 10 means worst imaginable pain? 1   Global Mental Health Score 12    Global Physical Health Score 17    PROMIS TOTAL - SUBSCORES 29          Referral / Collaboration:  Referral to another professional/service is not indicated at this time.     Anticipated number of session for this episode of care: 9-12 sessions  Anticipation frequency of session: Weekly  Anticipated Duration of each session: 38-52 minutes  Treatment plan will be reviewed in 90 days or when goals have been changed.         MeasurableTreatment Goal(s) related to diagnosis / functional impairment(s)  Goal 1: Patient will reduce her Vito-7 score by at least 2 points.    I will know I've met my goal when recognize I have thoughts and feelings related to anxiety and not have them hurt me.\"     Objective #A (Patient Action)                          Patient will identify at least 3 triggers for anxiety.  Status: New - Date: 3/13/2025       Intervention(s)  Therapist will teach  mindfulness skills.     Objective #B  Patient will attend and participate in social or recreational activities .  Status: New - Date: 3/13/2025     " "  Intervention(s)  Therapist will provide hw assignments on behavioral activation and exposure.     Objective #C  Patient will use cognitive strategies identified in therapy to challenge anxious thoughts.  Status: New - Date: 3/13/2025       Intervention(s)  Therapist will provide processed based ACT therapy.      Patient states that she has been having less anxiety \"especially in the last couple of weeks.\"      Patient has reviewed and agreed to the above plan.      Suze Goff MA [facility]  May  22, 2025   "

## 2025-06-09 PROBLEM — J34.3 NASAL TURBINATE HYPERTROPHY: Status: ACTIVE | Noted: 2025-06-09

## 2025-06-10 ENCOUNTER — OFFICE VISIT (OUTPATIENT)
Dept: OTOLARYNGOLOGY | Facility: CLINIC | Age: 55
End: 2025-06-10
Attending: NURSE PRACTITIONER
Payer: COMMERCIAL

## 2025-06-10 ENCOUNTER — PRE VISIT (OUTPATIENT)
Dept: OTOLARYNGOLOGY | Facility: CLINIC | Age: 55
End: 2025-06-10

## 2025-06-10 VITALS
OXYGEN SATURATION: 99 % | WEIGHT: 146.3 LBS | HEART RATE: 72 BPM | BODY MASS INDEX: 23.51 KG/M2 | DIASTOLIC BLOOD PRESSURE: 71 MMHG | HEIGHT: 66 IN | SYSTOLIC BLOOD PRESSURE: 119 MMHG

## 2025-06-10 DIAGNOSIS — J34.1 MUCOUS RETENTION CYST OF MAXILLARY SINUS: ICD-10-CM

## 2025-06-10 DIAGNOSIS — R44.8 FACIAL PRESSURE: Primary | ICD-10-CM

## 2025-06-10 PROBLEM — J34.89 LESION OR MASS OF PARANASAL SINUSES: Status: ACTIVE | Noted: 2025-06-10

## 2025-06-10 PROBLEM — R09.81 CONGESTION OF PARANASAL SINUS: Status: ACTIVE | Noted: 2025-06-10

## 2025-06-10 PROCEDURE — 31231 NASAL ENDOSCOPY DX: CPT | Performed by: STUDENT IN AN ORGANIZED HEALTH CARE EDUCATION/TRAINING PROGRAM

## 2025-06-10 PROCEDURE — 99203 OFFICE O/P NEW LOW 30 MIN: CPT | Mod: 25 | Performed by: STUDENT IN AN ORGANIZED HEALTH CARE EDUCATION/TRAINING PROGRAM

## 2025-06-10 PROCEDURE — 3078F DIAST BP <80 MM HG: CPT | Performed by: STUDENT IN AN ORGANIZED HEALTH CARE EDUCATION/TRAINING PROGRAM

## 2025-06-10 PROCEDURE — 1125F AMNT PAIN NOTED PAIN PRSNT: CPT | Performed by: STUDENT IN AN ORGANIZED HEALTH CARE EDUCATION/TRAINING PROGRAM

## 2025-06-10 PROCEDURE — 3074F SYST BP LT 130 MM HG: CPT | Performed by: STUDENT IN AN ORGANIZED HEALTH CARE EDUCATION/TRAINING PROGRAM

## 2025-06-10 RX ORDER — AMOXICILLIN AND CLAVULANATE POTASSIUM 250; 125 MG/1; MG/1
1 TABLET, FILM COATED ORAL 2 TIMES DAILY
COMMUNITY

## 2025-06-10 RX ORDER — AMOXICILLIN 250 MG/1
250 CAPSULE ORAL 2 TIMES DAILY
COMMUNITY

## 2025-06-10 ASSESSMENT — PAIN SCALES - GENERAL: PAINLEVEL_OUTOF10: MILD PAIN (1)

## 2025-06-10 NOTE — NURSING NOTE
"Chief Complaint   Patient presents with    Consult   Blood pressure 119/71, pulse 72, height 1.676 m (5' 6\"), weight 66.4 kg (146 lb 4.8 oz), SpO2 99%, not currently breastfeeding. Bj Matias, EMT    "

## 2025-06-10 NOTE — PROGRESS NOTES
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.1) Mucous retention cyst of maxillary sinus  Comment:   --notes left facial pressure with imaging demonstrating non obstructive MRC  --given the left predominant nature of the facial pressure, there is a possibility that the MRC may be contributing to these symptoms  --endoscopy today without extension into the middle meatus, inflammation, or infection  Plan:   --if symptoms are bothersome enough to her to pursue surgery, we can get a new CT scan since she notes that there has been a shift in her symptoms since the last CT  --if the updated CT demonstrates persistent MRC or obstruction of the drainage pathway, then we can pursue surgery to remove the cyst and open the sinus up  --we did discuss that there is a possibility that her facial pressure is not related to the MRC and as such she may continue to have symptoms even post operatively  --she is going to think about it and let us know      History of Present Illness:   Lennie NÚÑEZ  is a 55 year old female who presents for consultation regarding MRC.    Symptoms started in December of 2024  Pressure and fullness on the left   Feels like something is stuck on the left  No difficulty breathing through the nose  Had a piece of tissue come out \"raisin sized\"  Clear drainage, L > R  No change in sense of smell    No known allergies    Has used topical steroid, abx, afrin      Physical Exam:  Vital signs: /71 (BP Location: Right arm, Patient Position: Sitting, Cuff Size: Adult Regular)   Pulse 72   Ht 1.676 m (5' 6\")   Wt 66.4 kg (146 lb 4.8 oz)   SpO2 99%   BMI 23.61 kg/m     General Appearance: No acute distress, appropriate demeanor, " conversant  Eyes: moist conjunctivae; EOMI; pupils symmetric; visual acuity grossly intact; no proptosis  Head: normocephalic; overall symmetric appearance without deformity  Face: overall symmetric without deformity  Ears: Normal appearance of external ear; external meatus normal in appearance; TMs intact without perforation bilaterally;   Nose: No external deformity; septum (midline) ; inferior turbinates (non hypertrophic)   Oral Cavity/oropharynx: Normal appearance of mucosa; tongue midline; no mass or lesions; oropharynx without obvious mucosal abnormality  Neck: no palpable lymphadenopathy; thyroid without palpable nodules  Lungs: symmetric chest rise; no wheezing  CV: Good distal perfusion; normal hear rate  Extremities: No deformity  Neurologic Exam: Cranial nerves II-XII are grossly intact; no focal deficit    Procedure Note  Procedure performed: Rigid nasal endoscopy  Indication: To evaluate for sinonasal pathology not visualized on routine anterior rhinoscopy  Anesthesia: 4% topical lidocaine with 0.05% oxymetazoline  Description of procedure: A 0-degree, 4 mm rigid endoscope was inserted into bilateral nasal cavities and the inferior and middle turbinates, nasal valves, nasal cavity, inferior meatus, middle meatus, sphenoethmoid recess, and nasopharynx were thoroughly evaluated for evidence of obstruction, edema, purulence, polyps and/or mass/lesion.     Findings:    MM SER NP clear    The patient tolerated the procedure well without complication.     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

## 2025-06-10 NOTE — LETTER
"6/10/2025       RE: Lennie NÚÑEZ   [address]  [facility] [address]     Dear Colleague,    Thank you for referring your patient, Lennie NÚÑEZ , to the [facility] [place] at [facility]. Please see a copy of my visit note below.      [facility] - Rhinology & Skull Base Surgery  New Patient Visit      Encounter date:   Ada 10, 2025    Referring Provider:  JOHN Pritchett CNP  [address] [place] [address]    Assessment, Decision Making, and Plan:  (R44.8) Facial pressure  (primary encounter diagnosis)  (J34.1) Mucous retention cyst of maxillary sinus  Comment:   --notes left facial pressure with imaging demonstrating non obstructive MRC  --given the left predominant nature of the facial pressure, there is a possibility that the MRC may be contributing to these symptoms  --endoscopy today without extension into the middle meatus, inflammation, or infection  Plan:   --if symptoms are bothersome enough to her to pursue surgery, we can get a new CT scan since she notes that there has been a shift in her symptoms since the last CT  --if the updated CT demonstrates persistent MRC or obstruction of the drainage pathway, then we can pursue surgery to remove the cyst and open the sinus up  --we did discuss that there is a possibility that her facial pressure is not related to the MRC and as such she may continue to have symptoms even post operatively  --she is going to think about it and let us know      History of Present Illness:   Lennie NÚÑEZ  is a 55 year old female who presents for consultation regarding MRC.    Symptoms started in December of 2024  Pressure and fullness on the left   Feels like something is stuck on the left  No difficulty breathing through the nose  Had a piece of tissue come out \"raisin sized\"  Clear drainage, L > R  No change in sense of smell    No known " "allergies    Has used topical steroid, abx, afrin      Physical Exam:  Vital signs: /71 (BP Location: Right arm, Patient Position: Sitting, Cuff Size: Adult Regular)   Pulse 72   Ht 1.676 m (5' 6\")   Wt 66.4 kg (146 lb 4.8 oz)   SpO2 99%   BMI 23.61 kg/m     General Appearance: No acute distress, appropriate demeanor, conversant  Eyes: moist conjunctivae; EOMI; pupils symmetric; visual acuity grossly intact; no proptosis  Head: normocephalic; overall symmetric appearance without deformity  Face: overall symmetric without deformity  Ears: Normal appearance of external ear; external meatus normal in appearance; TMs intact without perforation bilaterally;   Nose: No external deformity; septum (midline) ; inferior turbinates (non hypertrophic)   Oral Cavity/oropharynx: Normal appearance of mucosa; tongue midline; no mass or lesions; oropharynx without obvious mucosal abnormality  Neck: no palpable lymphadenopathy; thyroid without palpable nodules  Lungs: symmetric chest rise; no wheezing  CV: Good distal perfusion; normal hear rate  Extremities: No deformity  Neurologic Exam: Cranial nerves II-XII are grossly intact; no focal deficit    Procedure Note  Procedure performed: Rigid nasal endoscopy  Indication: To evaluate for sinonasal pathology not visualized on routine anterior rhinoscopy  Anesthesia: 4% topical lidocaine with 0.05% oxymetazoline  Description of procedure: A 0-degree, 4 mm rigid endoscope was inserted into bilateral nasal cavities and the inferior and middle turbinates, nasal valves, nasal cavity, inferior meatus, middle meatus, sphenoethmoid recess, and nasopharynx were thoroughly evaluated for evidence of obstruction, edema, purulence, polyps and/or mass/lesion.     Findings:    MM SER NP clear    The patient tolerated the procedure well without complication.     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      Sincerely,    Hemal Valerio MD

## 2025-06-10 NOTE — PATIENT INSTRUCTIONS
You were seen in the ENT Clinic today by Dr. Valerio. If you have any questions or concerns after your appointment, please contact us (see below)    The following has been recommended for you based upon your appointment today:  Please reach out if you would like to obtain and updated CT scan.    Please return to clinic as needed.    How to Contact Us:  Send a Swatchcloud message to your provider. Our team will respond to you via Swatchcloud. Occasionally, we will need to call you to get further information.  For urgent matters (Monday-Friday), call the ENT Clinic: [phone] and speak with a call center team member - they will route your call appropriately.   If you'd like to speak directly with a nurse, please find our contact information below. We do our best to check voicemail frequently throughout the day, and will work to call you back within 1-2 days. For urgent matters, please use the general clinic phone numbers listed above.    Nadeen RIOS RN, BSN   RN Care Coordinator, ENT Clinic  [facility]  Direct: [phone]  Padmini TIWARI LPN  Direct: [phone]

## 2025-06-19 ENCOUNTER — VIRTUAL VISIT (OUTPATIENT)
Dept: PSYCHOLOGY | Facility: CLINIC | Age: 55
End: 2025-06-19
Payer: COMMERCIAL

## 2025-06-19 DIAGNOSIS — F43.22 ADJUSTMENT DISORDER WITH ANXIETY: Primary | ICD-10-CM

## 2025-06-19 PROCEDURE — 90834 PSYTX W PT 45 MINUTES: CPT | Mod: 95 | Performed by: PSYCHOLOGIST

## 2025-06-19 ASSESSMENT — ANXIETY QUESTIONNAIRES
GAD7 TOTAL SCORE: 0
GAD7 TOTAL SCORE: 0
2. NOT BEING ABLE TO STOP OR CONTROL WORRYING: NOT AT ALL
1. FEELING NERVOUS, ANXIOUS, OR ON EDGE: NOT AT ALL
7. FEELING AFRAID AS IF SOMETHING AWFUL MIGHT HAPPEN: NOT AT ALL
3. WORRYING TOO MUCH ABOUT DIFFERENT THINGS: NOT AT ALL
6. BECOMING EASILY ANNOYED OR IRRITABLE: NOT AT ALL
5. BEING SO RESTLESS THAT IT IS HARD TO SIT STILL: NOT AT ALL

## 2025-06-19 ASSESSMENT — PATIENT HEALTH QUESTIONNAIRE - PHQ9: 5. POOR APPETITE OR OVEREATING: NOT AT ALL

## 2025-06-19 NOTE — PROGRESS NOTES
[facility]                                     Progress Note    Patient Name: Lennie NÚÑEZ   Date: 6/19/2025         Service Type: Individual      Session Start Time: 04:02 Session End Time: 04:42     Session Length: 40 minutes    Session #: 12    Attendees: Client    Service Modality:  Video Visit:      Provider verified identity through the following two step process.  Patient provided:  Patient is known previously to provider    Telemedicine Visit: The patient's condition can be safely assessed and treated via synchronous audio and visual telemedicine encounter.      Reason for Telemedicine Visit: Patient has requested telehealth visit    Originating Site (Patient Location): Patient's home    Distant Site (Provider Location): Provider Remote Setting- Home Office    Consent:  The patient/guardian has verbally consented to: the potential risks and benefits of telemedicine (video visit) versus in person care; bill my insurance or make self-payment for services provided; and responsibility for payment of non-covered services.     Patient would like the video invitation sent by:  My Chart    Mode of Communication:  Video Conference via [facility]well    Distant Location (Provider):  Off-site    As the provider I attest to compliance with applicable laws and regulations related to telemedicine.    DATA   Interactive Complexity: No  Crisis: No        Progress Since Last Session (Related to Symptoms / Goals / Homework):   Symptoms: Improving      Homework: Achieved / completed to satisfaction       Episode of Care Goals: Satisfactory progress - MAINTENANCE (Working to maintain change, with risk of relapse); Intervened by continuing to positively reinforce healthy behavior choice        Current / Ongoing Stressors and Concerns:     Patient and provider met for an individual therapy session today. Patient reviewed her practice of the coping ahead skill after helping her mom recover from surgery. Patient  "and provider reviewed her progress in management of anxiety. Patient reports feeling comfortable discontinuing therapy at this time. Patient agreed to return should she have a mental health relapse in the future. Patient reports the the following values are important to her: Compassion, integrity,  justice, helping and contributing, putting good back in the world, intelligence and principles of \"live and let live\" and  \"Infinite diversity and infinite combination.\"      Treatment Objective(s) Addressed in This Session:   Discontinue treatment     Intervention:   Act: Values, mindfulness and acceptance.    Assessments completed prior to visit:  The following assessments were completed by patient for this visit:  PHQ9:       2/26/2025    11:27 AM 3/3/2025     7:18 AM 5/22/2025     3:27 PM   PHQ-9 SCORE   PHQ-9 Total Score MyChart 1 (Minimal depression) 1 (Minimal depression)    PHQ-9 Total Score 1  1  0       Patient-reported     GAD7:       2/25/2025     2:38 PM 3/22/2025     7:43 AM 5/22/2025     3:27 PM 6/19/2025     4:33 PM   RICKY-7 SCORE   Total Score 5 (mild anxiety) 3 (minimal anxiety)     Total Score 5  3  0 0       Patient-reported     CAGE-AID:       3/3/2025     7:30 AM   CAGE-AID Total Score   Total Score 0    Total Score MyChart 0 (A total score of 2 or greater is considered clinically significant)       Patient-reported     PROMIS 10-Global Health (all questions and answers displayed):       3/3/2025     7:29 AM 6/14/2025    10:04 AM   PROMIS 10   In general, would you say your health is: Very good Very good   In general, would you say your quality of life is: Very good Very good   In general, how would you rate your physical health? Very good Very good   In general, how would you rate your mental health, including your mood and your ability to think? Good Very good   In general, how would you rate your satisfaction with your social activities and relationships? Good Very good   In general, please rate " how well you carry out your usual social activities and roles Very good Very good   To what extent are you able to carry out your everyday physical activities such as walking, climbing stairs, carrying groceries, or moving a chair? Completely Completely   In the past 7 days, how often have you been bothered by emotional problems such as feeling anxious, depressed, or irritable? Often Rarely   In the past 7 days, how would you rate your fatigue on average? Mild None   In the past 7 days, how would you rate your pain on average, where 0 means no pain, and 10 means worst imaginable pain? 1 0   In general, would you say your health is: 4 4   In general, would you say your quality of life is: 4 4   In general, how would you rate your physical health? 4 4   In general, how would you rate your mental health, including your mood and your ability to think? 3 4   In general, how would you rate your satisfaction with your social activities and relationships? 3 4   In general, please rate how well you carry out your usual social activities and roles. (This includes activities at home, at work and in your community, and responsibilities as a parent, child, spouse, employee, friend, etc.) 4 4   To what extent are you able to carry out your everyday physical activities such as walking, climbing stairs, carrying groceries, or moving a chair? 5 5   In the past 7 days, how often have you been bothered by emotional problems such as feeling anxious, depressed, or irritable? 4 2   In the past 7 days, how would you rate your fatigue on average? 2 1   In the past 7 days, how would you rate your pain on average, where 0 means no pain, and 10 means worst imaginable pain? 1 0   Global Mental Health Score 12  16    Global Physical Health Score 17  19    PROMIS TOTAL - SUBSCORES 29  35        Patient-reported     [place] Suicide Severity Rating Scale (Lifetime/Recent)      3/3/2025    12:51 PM   [place] Suicide Severity Rating  (Lifetime/Recent)   1. Wish to be Dead (Lifetime) N   2. Non-Specific Active Suicidal Thoughts (Lifetime) N   Actual Attempt (Lifetime) N   Has subject engaged in non-suicidal self-injurious behavior? (Lifetime) N   Interrupted Attempts (Lifetime) N   Aborted or Self-Interrupted Attempt (Lifetime) N   Preparatory Acts or Behavior (Lifetime) N   Calculated C-SSRS Risk Score (Lifetime/Recent) No Risk Indicated         ASSESSMENT: Current Emotional / Mental Status (status of significant symptoms):   Risk status (Self / Other harm or suicidal ideation)   Patient denies current fears or concerns for personal safety.   Patient denies current or recent suicidal ideation or behaviors.   Patient denies current or recent homicidal ideation or behaviors.   Patient denies current or recent self injurious behavior or ideation.   Patient denies other safety concerns.   Patient reports there has been no change in risk factors since their last session.     Patient reports there has been no change in protective factors since their last session.     Recommended that patient call 911 or go to the local ED should there be a change in any of these risk factors     Appearance:   Appropriate    Eye Contact:   Good    Psychomotor Behavior: Normal    Attitude:   Pleasant Attentive   Orientation:   All   Speech    Rate / Production: Normal/ Responsive    Volume:  Normal    Mood:    Euthymic   Affect:    Bright    Thought Content:  Clear    Thought Form:  Coherent    Insight:    Good      Medication Review:   No changes to current psychiatric medication(s)     Medication Compliance:   Yes     Changes in Health Issues:   None reported     Chemical Use Review:   Substance Use: Chemical use reviewed, no active concerns identified      Tobacco Use: No current tobacco use.      Diagnosis:  1. Adjustment disorder with anxiety      Collateral Reports Completed:   Not Applicable    PLAN: (Patient Tasks / Therapist Tasks / Other)  Continue to practice  acceptance strategies  Reach out to reengage in therapy as needed.      Suze Goff MA [facility]                              ________________________________________________________________  Individual Treatment Plan     Patient's Name: Lennie NÚÑEZ                       YOB: 1970     Date of Creation: 3/13/2025  Date Treatment Plan Last Reviewed/Revised: 3/13/2025     DSM5 Diagnoses: Adjustment Disorders  309.24 (F43.22) With anxiety  Psychosocial / Contextual Factors: Psychosocial Factors:  Medical complexities, Grief and loss and impact to [facility].  Cultural and Contextual Factors: Aging mother who lives alone, the passing of two cats last year, political and economic climate, and self-employed.   PROMIS (reviewed every 90 days):       3/3/2025     7:29 AM   PROMIS 10   In general, would you say your health is: Very good   In general, would you say your quality of life is: Very good   In general, how would you rate your physical health? Very good   In general, how would you rate your mental health, including your mood and your ability to think? Good   In general, how would you rate your satisfaction with your social activities and relationships? Good   In general, please rate how well you carry out your usual social activities and roles Very good   To what extent are you able to carry out your everyday physical activities such as walking, climbing stairs, carrying groceries, or moving a chair? Completely   In the past 7 days, how often have you been bothered by emotional problems such as feeling anxious, depressed, or irritable? Often   In the past 7 days, how would you rate your fatigue on average? Mild   In the past 7 days, how would you rate your pain on average, where 0 means no pain, and 10 means worst imaginable pain? 1   In general, would you say your health is: 4   In general, would you say your quality of life is: 4   In general, how would you rate your physical  "health? 4   In general, how would you rate your mental health, including your mood and your ability to think? 3   In general, how would you rate your satisfaction with your social activities and relationships? 3   In general, please rate how well you carry out your usual social activities and roles. (This includes activities at home, at work and in your community, and responsibilities as a parent, child, spouse, employee, friend, etc.) 4   To what extent are you able to carry out your everyday physical activities such as walking, climbing stairs, carrying groceries, or moving a chair? 5   In the past 7 days, how often have you been bothered by emotional problems such as feeling anxious, depressed, or irritable? 4   In the past 7 days, how would you rate your fatigue on average? 2   In the past 7 days, how would you rate your pain on average, where 0 means no pain, and 10 means worst imaginable pain? 1   Global Mental Health Score 12    Global Physical Health Score 17    PROMIS TOTAL - SUBSCORES 29          Referral / Collaboration:  Referral to another professional/service is not indicated at this time.     Anticipated number of session for this episode of care: 9-12 sessions  Anticipation frequency of session: Weekly  Anticipated Duration of each session: 38-52 minutes  Treatment plan will be reviewed in 90 days or when goals have been changed.         MeasurableTreatment Goal(s) related to diagnosis / functional impairment(s)  Goal 1: Patient will reduce her Vito-7 score by at least 2 points.    I will know I've met my goal when recognize I have thoughts and feelings related to anxiety and not have them hurt me.\"     Objective #A (Patient Action)                          Patient will identify at least 3 triggers for anxiety.  Status: New - Date: 3/13/2025       Intervention(s)  Therapist will teach  mindfulness skills.     Objective #B  Patient will attend and participate in social or recreational activities " ".  Status: New - Date: 3/13/2025       Intervention(s)  Therapist will provide hw assignments on behavioral activation and exposure.     Objective #C  Patient will use cognitive strategies identified in therapy to challenge anxious thoughts.  Status: New - Date: 3/13/2025       Intervention(s)  Therapist will provide processed based ACT therapy.      Patient states that she has been having less anxiety \"especially in the last couple of weeks.\"     Patient reports running a 10k this week. Patient would like to continue to practice acceptance, staying in the present moment, and avoiding trying to control uncontrollable events.    Patient has reviewed and agreed to the above plan.      Suze Goff MA [facility]  June 19th, 2025   "

## 2025-07-07 ENCOUNTER — MYC REFILL (OUTPATIENT)
Dept: INTERNAL MEDICINE | Facility: CLINIC | Age: 55
End: 2025-07-07
Payer: COMMERCIAL

## 2025-07-07 DIAGNOSIS — N95.1 MENOPAUSAL SYNDROME (HOT FLASHES): ICD-10-CM

## 2025-07-07 RX ORDER — ESTRADIOL 1 MG/1
1 TABLET ORAL DAILY
Qty: 90 TABLET | Refills: 0 | Status: SHIPPED | OUTPATIENT
Start: 2025-07-07

## 2025-07-07 RX ORDER — PROGESTERONE 100 MG/1
100 CAPSULE ORAL DAILY
Qty: 90 CAPSULE | Refills: 0 | Status: SHIPPED | OUTPATIENT
Start: 2025-07-07